# Patient Record
Sex: MALE | Race: WHITE | Employment: OTHER | URBAN - METROPOLITAN AREA
[De-identification: names, ages, dates, MRNs, and addresses within clinical notes are randomized per-mention and may not be internally consistent; named-entity substitution may affect disease eponyms.]

---

## 2017-04-07 ENCOUNTER — LAB ENCOUNTER (OUTPATIENT)
Dept: LAB | Age: 62
End: 2017-04-07
Attending: FAMILY MEDICINE
Payer: COMMERCIAL

## 2017-04-07 ENCOUNTER — OFFICE VISIT (OUTPATIENT)
Dept: FAMILY MEDICINE CLINIC | Facility: CLINIC | Age: 62
End: 2017-04-07

## 2017-04-07 VITALS
DIASTOLIC BLOOD PRESSURE: 72 MMHG | HEIGHT: 70.75 IN | WEIGHT: 191 LBS | SYSTOLIC BLOOD PRESSURE: 110 MMHG | HEART RATE: 52 BPM | BODY MASS INDEX: 26.74 KG/M2

## 2017-04-07 DIAGNOSIS — Z85.46 HISTORY OF PROSTATE CANCER: ICD-10-CM

## 2017-04-07 DIAGNOSIS — Z00.00 LABORATORY EXAMINATION ORDERED AS PART OF A ROUTINE GENERAL MEDICAL EXAMINATION: ICD-10-CM

## 2017-04-07 DIAGNOSIS — R73.9 ELEVATED BLOOD SUGAR: ICD-10-CM

## 2017-04-07 DIAGNOSIS — M65.332 TRIGGER FINGER, LEFT MIDDLE FINGER: ICD-10-CM

## 2017-04-07 DIAGNOSIS — Z00.00 WELL ADULT EXAM: Primary | ICD-10-CM

## 2017-04-07 PROCEDURE — 84439 ASSAY OF FREE THYROXINE: CPT | Performed by: FAMILY MEDICINE

## 2017-04-07 PROCEDURE — 80050 GENERAL HEALTH PANEL: CPT | Performed by: FAMILY MEDICINE

## 2017-04-07 PROCEDURE — 82306 VITAMIN D 25 HYDROXY: CPT | Performed by: FAMILY MEDICINE

## 2017-04-07 PROCEDURE — 84153 ASSAY OF PSA TOTAL: CPT | Performed by: FAMILY MEDICINE

## 2017-04-07 PROCEDURE — 80061 LIPID PANEL: CPT | Performed by: FAMILY MEDICINE

## 2017-04-07 PROCEDURE — 36415 COLL VENOUS BLD VENIPUNCTURE: CPT | Performed by: FAMILY MEDICINE

## 2017-04-07 PROCEDURE — 99396 PREV VISIT EST AGE 40-64: CPT | Performed by: FAMILY MEDICINE

## 2017-04-07 PROCEDURE — 83036 HEMOGLOBIN GLYCOSYLATED A1C: CPT | Performed by: FAMILY MEDICINE

## 2017-04-07 NOTE — PATIENT INSTRUCTIONS
SCHEDULING [facility] LAB APPOINTMENTS ONLINE    Lab appointments can now be scheduled online at [email or web address]    · Go to [email or web address]  · In Search type Lab  · Click \"Lab services\"  · Click \"Schedule Your Test Online\"  · Follow the prompts  · If you a member of your immediate family has had colon cancer, especially if their cancer occurred before they were 48years old. Prostate cancer tests:  The older way of looking for prostate cancer, the rectal exam, is no longer regarded as the best way to scre and urine tests. When you have no symptoms of illness, you should discuss the pros and cons of these and other tests with your healthcare provider. Each test involves some expense. What shots do I need?    The following shots are recommended for adults: expect your healthcare provider to advise you regularly on other ways to stay healthy. Some of these may include:   Substance use: Do not use tobacco or illegal drugs.  Avoid using alcohol while driving, swimming, boating, etc.   Diet and exercise: Try to m

## 2017-04-07 NOTE — PROGRESS NOTES
Demetrio Hernandez is a 58year old male who presents for a complete physical exam.   HPI:   Pt complains of cold for 6 weeks had one before xmas was on antibiotic then started mid [address] No sinus pain, Causes cough no shortness of breath. .  Sleep is good, s Pathologist:           Saturnino Finley MD                                                           Specimens:   A) - Colon polyp, Descending polyp                                                                 B) - Rectum, Rectal polyp TONSILLECTOMY      BACK SURGERY  1992    Comment Laminectomy Lumbar L4 L5    BACK SURGERY  4/1/13    Comment L4-S1 revision decomp with TLIF with Dr Gladis Menon at [address] [facility] 7/13/2016    Comment Procedure: COLONOSCOPY, POSSIBLE BIOPSY, POSS 70.75\"  Wt 191 lb  BMI 26.83 kg/m2  Body mass index is 26.83 kg/(m^2).    GENERAL: NAD, pleasant, well developed, normal voice  SKIN: no rashes, no suspicious moles or lesions  HENT: NCAT, EACs clear b/l, TMs normal b/l, nasal turbinatess normal b/l, oroph Encounter  mmm PSA  mmm TSH and Free T4 [E]  CBC W Differential W Platelet [E]  Comp Metabolic Panel (14) [E]  Lipid Panel [E]  Vitamin D, 25-Hydroxy [E]    Meds & Refills for this Visit:  No prescriptions requested or ordered in this encounter    Imaging history and that of your family are also important. What needs to be checked and how often?    The tests listed below are recommended for routine healthcare by the US Preventive Services Task Force (USPSTF) and the [employer] [place] (AAF with your healthcare provider. This is especially important if you have a higher risk of getting prostate cancer, for example, if you are  or have a family history of prostate cancer.    Gonorrhea and syphilis tests: if you are at high risk even if it has been less than 10 years since your last tetanus booster. Flu shot every fall if you are 50 or older, you have a high risk for complications from the flu, or you might spread the flu to others who are at high risk.    Measles, mumps, and rub in locked cabinets when they are not in use. Install smoke detectors in your home. Set your hot water heater to less than 120°F (49°C). Dental health: Visit your dentist regularly. Brush your teeth with fluoride toothpaste daily.  Also floss your teeth da

## 2017-04-08 PROBLEM — M65.332 TRIGGER FINGER, LEFT MIDDLE FINGER: Status: ACTIVE | Noted: 2017-04-08

## 2017-04-08 NOTE — PROGRESS NOTES
Quick Note:    Cholesterol is slightly elevated but has excellent HDL no treatment needed continue with good diet and exercise. Elevated blood sugar add hemoglobin A1c. Lab results showed low Vitamin D.  Advise RX vitamin D 50,000 IU once weekly for 12 w

## 2017-04-10 ENCOUNTER — TELEPHONE (OUTPATIENT)
Dept: FAMILY MEDICINE CLINIC | Facility: CLINIC | Age: 62
End: 2017-04-10

## 2017-04-10 DIAGNOSIS — R73.9 ELEVATED BLOOD SUGAR: Primary | ICD-10-CM

## 2017-04-10 DIAGNOSIS — E55.9 VITAMIN D DEFICIENCY: ICD-10-CM

## 2017-04-10 RX ORDER — ERGOCALCIFEROL 1.25 MG/1
50000 CAPSULE ORAL WEEKLY
Qty: 12 CAPSULE | Refills: 0 | Status: SHIPPED | OUTPATIENT
Start: 2017-04-10 | End: 2017-07-09

## 2017-04-10 NOTE — TELEPHONE ENCOUNTER
The patient was informed of his test results and Dr. Maia Goldmann recommendations. Vitamin D prescription was electronically sent to the patient's preferred pharmacy and future vitamin Dc lab order has been placed.    [facility] lab confirmed that the hem

## 2017-04-10 NOTE — TELEPHONE ENCOUNTER
----- Message from Colten Bermudez PA-C sent at 4/8/2017  8:13 AM CDT -----  Cholesterol is slightly elevated but has excellent HDL no treatment needed continue with good diet and exercise. Elevated blood sugar add hemoglobin A1c.   Lab results showed

## 2017-04-10 NOTE — PROGRESS NOTES
Quick Note:    HBA1C indicates glucose intolerance c/w appropriate diet and exercise. Reduce the simple and complex carbohydrates including the white bread, rice and pasta.  Increase vegetables, fruits, protein and substitute complex carbohydrates with high

## 2017-04-11 ENCOUNTER — TELEPHONE (OUTPATIENT)
Dept: FAMILY MEDICINE CLINIC | Facility: CLINIC | Age: 62
End: 2017-04-11

## 2017-04-11 DIAGNOSIS — R73.02 GLUCOSE INTOLERANCE (IMPAIRED GLUCOSE TOLERANCE): Primary | ICD-10-CM

## 2017-04-11 NOTE — TELEPHONE ENCOUNTER
----- Message from Pankaj Vick PA-C sent at 4/10/2017  2:45 PM CDT -----  HBA1C indicates glucose intolerance c/w appropriate diet and exercise. Reduce the simple and complex carbohydrates including the white bread, rice and pasta.  Increase vegetabl

## 2017-04-26 ENCOUNTER — TELEPHONE (OUTPATIENT)
Dept: FAMILY MEDICINE CLINIC | Facility: CLINIC | Age: 62
End: 2017-04-26

## 2017-04-26 NOTE — TELEPHONE ENCOUNTER
lmom for pt with date of last tetanus 12/27/2010. Asked pt after reviewing message to call office with any questions.

## 2017-09-01 ENCOUNTER — LAB ENCOUNTER (OUTPATIENT)
Dept: LAB | Age: 62
End: 2017-09-01
Attending: FAMILY MEDICINE
Payer: COMMERCIAL

## 2017-09-01 DIAGNOSIS — E55.9 VITAMIN D DEFICIENCY: ICD-10-CM

## 2017-09-01 LAB — 25-HYDROXYVITAMIN D (TOTAL): 35.7 NG/ML (ref 30–100)

## 2017-09-01 PROCEDURE — 82306 VITAMIN D 25 HYDROXY: CPT

## 2017-09-01 PROCEDURE — 36415 COLL VENOUS BLD VENIPUNCTURE: CPT

## 2017-10-23 ENCOUNTER — LAB ENCOUNTER (OUTPATIENT)
Dept: LAB | Age: 62
End: 2017-10-23
Attending: FAMILY MEDICINE
Payer: COMMERCIAL

## 2017-10-23 DIAGNOSIS — R73.02 GLUCOSE INTOLERANCE (IMPAIRED GLUCOSE TOLERANCE): ICD-10-CM

## 2017-10-23 PROCEDURE — 83036 HEMOGLOBIN GLYCOSYLATED A1C: CPT

## 2017-10-23 PROCEDURE — 36415 COLL VENOUS BLD VENIPUNCTURE: CPT

## 2017-10-24 ENCOUNTER — TELEPHONE (OUTPATIENT)
Dept: FAMILY MEDICINE CLINIC | Facility: CLINIC | Age: 62
End: 2017-10-24

## 2017-10-24 DIAGNOSIS — R73.02 GLUCOSE INTOLERANCE (IMPAIRED GLUCOSE TOLERANCE): Primary | ICD-10-CM

## 2017-10-24 NOTE — TELEPHONE ENCOUNTER
----- Message from Cayden Park PA-C sent at 10/24/2017 12:58 PM CDT -----  HBA1C is improved still glucose intolerance watch carbohydrates and repeat in 6 months.   Order future tests/medications sent to my chart

## 2017-11-29 ENCOUNTER — OFFICE VISIT (OUTPATIENT)
Dept: FAMILY MEDICINE CLINIC | Facility: CLINIC | Age: 62
End: 2017-11-29

## 2017-11-29 VITALS
BODY MASS INDEX: 27.32 KG/M2 | HEART RATE: 56 BPM | HEIGHT: 70.39 IN | DIASTOLIC BLOOD PRESSURE: 66 MMHG | SYSTOLIC BLOOD PRESSURE: 122 MMHG | TEMPERATURE: 98 F | WEIGHT: 193 LBS

## 2017-11-29 DIAGNOSIS — J01.10 ACUTE FRONTAL SINUSITIS, RECURRENCE NOT SPECIFIED: Primary | ICD-10-CM

## 2017-11-29 DIAGNOSIS — Z23 NEED FOR VACCINATION: ICD-10-CM

## 2017-11-29 PROCEDURE — 90471 IMMUNIZATION ADMIN: CPT | Performed by: FAMILY MEDICINE

## 2017-11-29 PROCEDURE — 90686 IIV4 VACC NO PRSV 0.5 ML IM: CPT | Performed by: FAMILY MEDICINE

## 2017-11-29 PROCEDURE — 99214 OFFICE O/P EST MOD 30 MIN: CPT | Performed by: FAMILY MEDICINE

## 2017-11-29 RX ORDER — GUAIFENESIN AND CODEINE PHOSPHATE 100; 10 MG/5ML; MG/5ML
5 SOLUTION ORAL 3 TIMES DAILY PRN
Qty: 118 ML | Refills: 0 | Status: SHIPPED | OUTPATIENT
Start: 2017-11-29 | End: 2017-12-13

## 2017-11-29 RX ORDER — AZITHROMYCIN 250 MG/1
TABLET, FILM COATED ORAL
Qty: 6 TABLET | Refills: 0 | Status: SHIPPED | OUTPATIENT
Start: 2017-11-29 | End: 2018-05-22 | Stop reason: ALTCHOICE

## 2017-11-29 RX ORDER — BENZONATATE 200 MG/1
200 CAPSULE ORAL 3 TIMES DAILY PRN
Qty: 20 CAPSULE | Refills: 0 | Status: SHIPPED | OUTPATIENT
Start: 2017-11-29 | End: 2018-05-22 | Stop reason: ALTCHOICE

## 2017-11-29 NOTE — PATIENT INSTRUCTIONS
--rest, fluids, soups, humidifier, tea with lemon or honey, tylenol/advil as needed for discomfort  -- cough syrup before bed or as needed (no driving as may cause drowsiness)  -- benzonatate as needed for cough (non drowsy  -- otc generic mucinex (or

## 2017-11-30 NOTE — PROGRESS NOTES
CC:  Marycarmen Shields is a 58year old male here for Patient presents with:  URI: Rm. 1      HPI:     URI  -started 1 wk ago  -associated with cough, congestion, sinus pressure  -previous treatment: otc meds  -no fevers  -sick contacts: none    REVIEW OF SYSTE Tendon  No date: OTHER SURGICAL HISTORY      Comment: Prostate Surgery  No date: TONSILLECTOMY   Social History:    Smoking status: Former Smoker                                                              Packs/day: 0.00      Years: 0.00         Types: C times daily as needed for cough. Imaging & Referrals:  FLULAVAL INFLUENZA VACCINE QUAD PRESERVATIVE FREE 0.5 ML     The patient indicates understanding of these issues and agrees to the plan. The patient is asked to return in prn.   [address] [facility], [address]

## 2018-03-22 ENCOUNTER — HOSPITAL ENCOUNTER (OUTPATIENT)
Dept: CARDIOLOGY CLINIC | Facility: HOSPITAL | Age: 63
Discharge: HOME | End: 2018-03-22
Attending: FAMILY MEDICINE

## 2018-03-22 DIAGNOSIS — Z13.9 ENCOUNTER FOR SCREENING: ICD-10-CM

## 2018-03-26 ENCOUNTER — TELEPHONE (OUTPATIENT)
Dept: FAMILY MEDICINE CLINIC | Facility: CLINIC | Age: 63
End: 2018-03-26

## 2018-03-26 NOTE — TELEPHONE ENCOUNTER
----- Message from Rosalba Hassan PA-C sent at 3/23/2018  5:15 PM CDT -----  Normal carotid artery u/s screening

## 2018-04-26 ENCOUNTER — LAB ENCOUNTER (OUTPATIENT)
Dept: LAB | Age: 63
End: 2018-04-26
Attending: FAMILY MEDICINE
Payer: COMMERCIAL

## 2018-04-26 DIAGNOSIS — R73.02 GLUCOSE INTOLERANCE (IMPAIRED GLUCOSE TOLERANCE): ICD-10-CM

## 2018-04-26 PROCEDURE — 83036 HEMOGLOBIN GLYCOSYLATED A1C: CPT

## 2018-04-26 PROCEDURE — 36415 COLL VENOUS BLD VENIPUNCTURE: CPT

## 2018-04-27 ENCOUNTER — TELEPHONE (OUTPATIENT)
Dept: FAMILY MEDICINE CLINIC | Facility: CLINIC | Age: 63
End: 2018-04-27

## 2018-04-27 DIAGNOSIS — E74.39 GLUCOSE INTOLERANCE: Primary | ICD-10-CM

## 2018-04-27 NOTE — TELEPHONE ENCOUNTER
----- Message from Adi Hylton PA-C sent at 4/26/2018  8:34 PM CDT -----  Hemoglobin A1c is normal at 5.6 repeat every 6 months secondary to history of glucose intolerance. .  Order future tests/medications sent to my chart    Future labs have been or

## 2018-04-27 NOTE — PROGRESS NOTES
Hemoglobin A1c is normal at 5.6 repeat every 6 months secondary to history of glucose intolerance. .  Order future tests/medications sent to my chart

## 2018-05-22 ENCOUNTER — OFFICE VISIT (OUTPATIENT)
Dept: FAMILY MEDICINE CLINIC | Facility: CLINIC | Age: 63
End: 2018-05-22

## 2018-05-22 VITALS
WEIGHT: 193 LBS | HEART RATE: 62 BPM | OXYGEN SATURATION: 98 % | HEIGHT: 69.69 IN | SYSTOLIC BLOOD PRESSURE: 112 MMHG | BODY MASS INDEX: 27.94 KG/M2 | TEMPERATURE: 98 F | DIASTOLIC BLOOD PRESSURE: 74 MMHG

## 2018-05-22 DIAGNOSIS — R05.9 COUGH: ICD-10-CM

## 2018-05-22 DIAGNOSIS — J01.10 ACUTE FRONTAL SINUSITIS, RECURRENCE NOT SPECIFIED: Primary | ICD-10-CM

## 2018-05-22 PROCEDURE — 99214 OFFICE O/P EST MOD 30 MIN: CPT | Performed by: FAMILY MEDICINE

## 2018-05-22 RX ORDER — BENZONATATE 200 MG/1
200 CAPSULE ORAL 3 TIMES DAILY PRN
Qty: 20 CAPSULE | Refills: 0 | Status: SHIPPED | OUTPATIENT
Start: 2018-05-22 | End: 2018-11-26 | Stop reason: ALTCHOICE

## 2018-05-22 RX ORDER — GUAIFENESIN AND CODEINE PHOSPHATE 100; 10 MG/5ML; MG/5ML
5 SOLUTION ORAL 3 TIMES DAILY PRN
Qty: 118 ML | Refills: 0 | Status: SHIPPED | OUTPATIENT
Start: 2018-05-22 | End: 2018-06-05

## 2018-05-22 NOTE — PATIENT INSTRUCTIONS
-- rest, fluids, soups, humidifier, tea with lemon or honey, tylenol/advil as needed for discomfort  -- cough syrup before bed or as needed (no driving as may cause drowsiness)  -- benzonatate as needed  -- continue generic mucinex (or with DM if coughin

## 2018-05-22 NOTE — PROGRESS NOTES
CC:  Estela Babcock is a 61year old male here for Patient presents with:  URI: sore throat, runny nose, nasal congestion, and cough.  Symptoms started approximately 8 days ago  Breathing Problem: Hard to breath at night       HPI:     URI  -started 8 days ag OTHER SURGICAL HISTORY      Comment: Ruptured Achilles Tendon  No date: OTHER SURGICAL HISTORY      Comment: Prostate Surgery  No date: TONSILLECTOMY   Social History:    Smoking status: Former Smoker issues and agrees to the plan. The patient is asked to return in prn.   Carlyn Sebastian MD

## 2018-11-26 ENCOUNTER — OFFICE VISIT (OUTPATIENT)
Dept: FAMILY MEDICINE CLINIC | Facility: CLINIC | Age: 63
End: 2018-11-26
Payer: COMMERCIAL

## 2018-11-26 VITALS
WEIGHT: 191 LBS | HEIGHT: 70.55 IN | HEART RATE: 60 BPM | TEMPERATURE: 98 F | SYSTOLIC BLOOD PRESSURE: 114 MMHG | BODY MASS INDEX: 27.04 KG/M2 | DIASTOLIC BLOOD PRESSURE: 80 MMHG

## 2018-11-26 DIAGNOSIS — Z85.46 HISTORY OF PROSTATE CANCER: ICD-10-CM

## 2018-11-26 DIAGNOSIS — Z00.00 LABORATORY EXAMINATION ORDERED AS PART OF A ROUTINE GENERAL MEDICAL EXAMINATION: ICD-10-CM

## 2018-11-26 DIAGNOSIS — E55.9 VITAMIN D DEFICIENCY: ICD-10-CM

## 2018-11-26 DIAGNOSIS — H61.22 IMPACTED CERUMEN OF LEFT EAR: ICD-10-CM

## 2018-11-26 DIAGNOSIS — E74.39 GLUCOSE INTOLERANCE: ICD-10-CM

## 2018-11-26 DIAGNOSIS — Z23 NEED FOR VACCINATION: ICD-10-CM

## 2018-11-26 DIAGNOSIS — Z00.00 WELL ADULT EXAM: Primary | ICD-10-CM

## 2018-11-26 DIAGNOSIS — D22.9 MULTIPLE PIGMENTED NEVI: ICD-10-CM

## 2018-11-26 PROCEDURE — 99396 PREV VISIT EST AGE 40-64: CPT | Performed by: FAMILY MEDICINE

## 2018-11-26 PROCEDURE — 90471 IMMUNIZATION ADMIN: CPT | Performed by: FAMILY MEDICINE

## 2018-11-26 PROCEDURE — 90686 IIV4 VACC NO PRSV 0.5 ML IM: CPT | Performed by: FAMILY MEDICINE

## 2018-11-26 NOTE — PROGRESS NOTES
Perla Funes is a 61year old male who presents for a complete physical exam.   HPI:   Pt here for CPE .   Immunizations needed Shingles new vaccine    Running  Still at a 1/2 marathon doing well with exercise   Denies any chest pain, SOB, dizziness or farooq tablet by mouth daily.  Disp:  Rfl:       Past Medical History:   Diagnosis Date   • Colon polyps    • Personal history of malignant neoplasm of prostate 01/26/12   • Unspecified essential hypertension       Past Surgical History:   Procedure Laterality Gurvinder swelling  GI: denies abdominal pain,denies heartburn, denies n/v/c/d/change in stools/blood in stool/black stool/change in appetite  : denies nocturia or changes in urinary stream, denies scrotal mass/abnormal discharge from urethra  MUSCULOSKELETAL: den symmetric.  Gait is normal.  PSYCH: normal affect, no apparent thought disorder, average judgement and insight      Immunization History  Administered            Date(s) Administered    >=3 YRS TRI  MULTIDOSE VIAL (01787) FLU CLINIC FREE    7. Vitamin D deficiency  - VITAMIN D, 25-HYDROXY    8. Laboratory examination ordered as part of a routine general medical examination  - CBC WITH DIFFERENTIAL WITH PLATELET  - COMP METABOLIC PANEL (14)  - LIPID PANEL  - VITAMIN D, 25-HYDROXY  - TS

## 2018-11-28 ENCOUNTER — TELEPHONE (OUTPATIENT)
Dept: FAMILY MEDICINE CLINIC | Facility: CLINIC | Age: 63
End: 2018-11-28

## 2018-11-28 DIAGNOSIS — E55.9 VITAMIN D DEFICIENCY: Primary | ICD-10-CM

## 2018-11-28 DIAGNOSIS — E74.39 GLUCOSE INTOLERANCE: ICD-10-CM

## 2018-11-28 RX ORDER — ERGOCALCIFEROL 1.25 MG/1
50000 CAPSULE ORAL WEEKLY
Qty: 12 CAPSULE | Refills: 0 | Status: SHIPPED | OUTPATIENT
Start: 2018-11-28 | End: 2019-02-14

## 2018-11-28 NOTE — PROGRESS NOTES
CBC, TSH and PSA are normal.  Lipids elevated follow up to discuss medication. Lab results showed low vitamin D. Advise prescription vitamin D 50,000 international units once weekly for 12 weeks.  After 3 months start a maintenance dose of over-the-counte

## 2018-11-28 NOTE — TELEPHONE ENCOUNTER
An order was placed for Vitamin D to be done in 3 months and prescription for Vitamin D was sent to the patient's pharmacy. An order was also placed for a HgbA1c to be done in 6 months.

## 2018-11-28 NOTE — TELEPHONE ENCOUNTER
----- Message from Santana Corona PA-C sent at 11/27/2018  8:02 PM CST -----  CBC, TSH and PSA are normal.  Lipids elevated follow up to discuss medication. Lab results showed low vitamin D.   Advise prescription vitamin D 50,000 international units onc

## 2019-01-22 ENCOUNTER — OFFICE VISIT (OUTPATIENT)
Dept: FAMILY MEDICINE CLINIC | Facility: CLINIC | Age: 64
End: 2019-01-22
Payer: COMMERCIAL

## 2019-01-22 VITALS
WEIGHT: 197.5 LBS | HEIGHT: 70.55 IN | SYSTOLIC BLOOD PRESSURE: 130 MMHG | HEART RATE: 62 BPM | OXYGEN SATURATION: 98 % | BODY MASS INDEX: 27.96 KG/M2 | DIASTOLIC BLOOD PRESSURE: 70 MMHG | TEMPERATURE: 98 F

## 2019-01-22 DIAGNOSIS — R05.9 COUGH: Primary | ICD-10-CM

## 2019-01-22 PROCEDURE — 99214 OFFICE O/P EST MOD 30 MIN: CPT | Performed by: FAMILY MEDICINE

## 2019-01-22 RX ORDER — BENZONATATE 200 MG/1
200 CAPSULE ORAL 3 TIMES DAILY PRN
Qty: 20 CAPSULE | Refills: 0 | Status: SHIPPED | OUTPATIENT
Start: 2019-01-22 | End: 2019-09-05 | Stop reason: ALTCHOICE

## 2019-01-22 RX ORDER — AZITHROMYCIN 250 MG/1
TABLET, FILM COATED ORAL
Qty: 6 TABLET | Refills: 0 | Status: SHIPPED | OUTPATIENT
Start: 2019-01-22 | End: 2019-09-05 | Stop reason: ALTCHOICE

## 2019-01-22 RX ORDER — GUAIFENESIN AND CODEINE PHOSPHATE 100; 10 MG/5ML; MG/5ML
5 SOLUTION ORAL 3 TIMES DAILY PRN
Qty: 118 ML | Refills: 0 | Status: SHIPPED | OUTPATIENT
Start: 2019-01-22 | End: 2019-10-11

## 2019-01-22 NOTE — PATIENT INSTRUCTIONS
--rest, fluids, soups, humidifier, tea with lemon or honey, tylenol/advil as needed for discomfort  -- cough syrup before bed or as needed (no driving as may cause drowsiness)  -- benzonatate as needed for cough (non - drowsy)  -- otc generic mucinex (or

## 2019-02-12 DIAGNOSIS — E55.9 VITAMIN D DEFICIENCY: ICD-10-CM

## 2019-02-12 RX ORDER — ERGOCALCIFEROL 1.25 MG/1
CAPSULE ORAL
Qty: 12 CAPSULE | Refills: 0 | OUTPATIENT
Start: 2019-02-12

## 2019-03-07 ENCOUNTER — APPOINTMENT (OUTPATIENT)
Dept: LAB | Age: 64
End: 2019-03-07
Attending: FAMILY MEDICINE
Payer: COMMERCIAL

## 2019-03-07 LAB — VIT D+METAB SERPL-MCNC: 36.7 NG/ML (ref 30–100)

## 2019-08-27 ENCOUNTER — APPOINTMENT (OUTPATIENT)
Dept: LAB | Age: 64
End: 2019-08-27
Attending: FAMILY MEDICINE
Payer: COMMERCIAL

## 2019-08-27 LAB
EST. AVERAGE GLUCOSE BLD GHB EST-MCNC: 126 MG/DL (ref 68–126)
HBA1C MFR BLD HPLC: 6 % (ref ?–5.7)

## 2019-09-04 ENCOUNTER — TELEPHONE (OUTPATIENT)
Dept: FAMILY MEDICINE CLINIC | Facility: CLINIC | Age: 64
End: 2019-09-04

## 2019-09-04 NOTE — TELEPHONE ENCOUNTER
Left message for patient recommending he schedule an appointment for further evaluation of symptoms and adequate management recommendations.

## 2019-09-04 NOTE — TELEPHONE ENCOUNTER
Patient called wanting to know how he can obtain a referral/order for PT for his hamstring injury states this has been an ongoing issue but started bothering him again 2 weeks ago.

## 2019-09-05 ENCOUNTER — OFFICE VISIT (OUTPATIENT)
Dept: FAMILY MEDICINE CLINIC | Facility: CLINIC | Age: 64
End: 2019-09-05
Payer: COMMERCIAL

## 2019-09-05 VITALS
HEIGHT: 70.55 IN | HEART RATE: 56 BPM | DIASTOLIC BLOOD PRESSURE: 74 MMHG | BODY MASS INDEX: 27.61 KG/M2 | WEIGHT: 195 LBS | SYSTOLIC BLOOD PRESSURE: 104 MMHG

## 2019-09-05 DIAGNOSIS — S76.311A STRAIN OF RIGHT HAMSTRING MUSCLE, INITIAL ENCOUNTER: Primary | ICD-10-CM

## 2019-09-05 PROCEDURE — 99213 OFFICE O/P EST LOW 20 MIN: CPT | Performed by: FAMILY MEDICINE

## 2019-09-05 RX ORDER — CYCLOBENZAPRINE HCL 10 MG
TABLET ORAL NIGHTLY PRN
Qty: 15 TABLET | Refills: 0 | Status: SHIPPED | OUTPATIENT
Start: 2019-09-05 | End: 2019-09-25

## 2019-09-05 RX ORDER — METHYLPREDNISOLONE 4 MG/1
TABLET ORAL
Qty: 1 KIT | Refills: 0 | Status: SHIPPED | OUTPATIENT
Start: 2019-09-05 | End: 2019-10-11 | Stop reason: ALTCHOICE

## 2019-09-05 NOTE — PROGRESS NOTES
Fredy Valadez is a 59year old male. HPI:   Patient is an active runner is in secondary to a strain in the medial aspect of his hamstring on the right side.   Patient states that he runs avidly was going to do a half marathon next month and now with the inj headaches  Musculoskeletal: see above  EXAM:   /74 (BP Location: Left arm, Patient Position: Sitting, Cuff Size: adult)   Pulse 56   Ht 70.55\"   Wt 195 lb   BMI 27.55 kg/m²   GENERAL: well developed, well nourished,in no apparent distress  SKIN: no methylPREDNISolone (MEDROL) 4 MG Oral Tablet Therapy Pack; As directed. Dispense: 1 kit; Refill: 0  - Cyclobenzaprine HCl 10 MG Oral Tab; Take 0.5-1 tablets (5-10 mg total) by mouth nightly as needed for Muscle spasms. Dispense: 15 tablet;  Refill: 0  - O

## 2019-10-11 ENCOUNTER — OFFICE VISIT (OUTPATIENT)
Dept: FAMILY MEDICINE CLINIC | Facility: CLINIC | Age: 64
End: 2019-10-11
Payer: COMMERCIAL

## 2019-10-11 VITALS
DIASTOLIC BLOOD PRESSURE: 72 MMHG | HEART RATE: 72 BPM | HEIGHT: 70.55 IN | WEIGHT: 196 LBS | SYSTOLIC BLOOD PRESSURE: 130 MMHG | TEMPERATURE: 99 F | OXYGEN SATURATION: 96 % | BODY MASS INDEX: 27.75 KG/M2

## 2019-10-11 DIAGNOSIS — R05.9 COUGH: ICD-10-CM

## 2019-10-11 DIAGNOSIS — J06.9 UPPER RESPIRATORY TRACT INFECTION, UNSPECIFIED TYPE: Primary | ICD-10-CM

## 2019-10-11 PROCEDURE — 99214 OFFICE O/P EST MOD 30 MIN: CPT | Performed by: FAMILY MEDICINE

## 2019-10-11 RX ORDER — ALBUTEROL SULFATE 90 UG/1
2 AEROSOL, METERED RESPIRATORY (INHALATION) EVERY 6 HOURS PRN
Qty: 1 INHALER | Refills: 0 | Status: SHIPPED | OUTPATIENT
Start: 2019-10-11 | End: 2019-12-03 | Stop reason: ALTCHOICE

## 2019-10-11 RX ORDER — AZITHROMYCIN 250 MG/1
TABLET, FILM COATED ORAL
COMMUNITY
Start: 2019-10-10 | End: 2019-12-03 | Stop reason: ALTCHOICE

## 2019-10-11 RX ORDER — GUAIFENESIN AND CODEINE PHOSPHATE 100; 10 MG/5ML; MG/5ML
5 SOLUTION ORAL 3 TIMES DAILY PRN
Qty: 118 ML | Refills: 0 | Status: SHIPPED | OUTPATIENT
Start: 2019-10-11 | End: 2019-10-11

## 2019-10-11 RX ORDER — BENZONATATE 200 MG/1
CAPSULE ORAL
Refills: 0 | COMMUNITY
Start: 2019-10-07 | End: 2019-12-03 | Stop reason: ALTCHOICE

## 2019-10-11 RX ORDER — PREDNISONE 20 MG/1
TABLET ORAL
Refills: 0 | COMMUNITY
Start: 2019-10-07 | End: 2019-12-03 | Stop reason: ALTCHOICE

## 2019-10-11 RX ORDER — GUAIFENESIN AND CODEINE PHOSPHATE 100; 10 MG/5ML; MG/5ML
5 SOLUTION ORAL 3 TIMES DAILY PRN
Qty: 118 ML | Refills: 0 | Status: SHIPPED | OUTPATIENT
Start: 2019-10-11 | End: 2019-10-25

## 2019-10-11 NOTE — PATIENT INSTRUCTIONS
- would expect slow continued improvement  - complete prednisone  - tessalon as needed  -start albuterol inhaler as needed  -- start guaifenesin with codeine as needed for cough cough (no driving while taking as may cause drowsiness)  -start zpak only if

## 2019-10-11 NOTE — PROGRESS NOTES
CC:  Juana De Leon is a 59year old male here for Patient presents with:  Sore Throat: Phlegms, sore throat, fevers, bodychills, no bodyache on and off x 2 weeks. Patient believes he might have bronchitis.       HPI:     URI  -started 2 wks ago  -5 days ago, POSSIBLE BIOPSY, POSSIBLE POLYPECTOMY [address] N/A 7/13/2016    Performed by Claudette Mesa, MD at [facility]   • OTHER SURGICAL HISTORY      knee arthroscopy   • OTHER SURGICAL HISTORY  1990    Ruptured Achilles Tendon   • OTHER SURGICAL HISTOR every 6 (six) hours as needed for Wheezing (or cough). • guaiFENesin-Codeine 100-10 MG/5ML Oral Syrup 118 mL 0     Sig: Take 5 mL by mouth 3 (three) times daily as needed (cough).        Imaging & Referrals:  None     The patient indicates understanding o

## 2019-12-03 ENCOUNTER — OFFICE VISIT (OUTPATIENT)
Dept: FAMILY MEDICINE CLINIC | Facility: CLINIC | Age: 64
End: 2019-12-03
Payer: COMMERCIAL

## 2019-12-03 VITALS
WEIGHT: 191 LBS | BODY MASS INDEX: 26.74 KG/M2 | HEART RATE: 72 BPM | SYSTOLIC BLOOD PRESSURE: 124 MMHG | DIASTOLIC BLOOD PRESSURE: 80 MMHG | HEIGHT: 70.75 IN

## 2019-12-03 DIAGNOSIS — Z00.00 WELL ADULT EXAM: Primary | ICD-10-CM

## 2019-12-03 DIAGNOSIS — Z00.00 LABORATORY EXAMINATION ORDERED AS PART OF A ROUTINE GENERAL MEDICAL EXAMINATION: ICD-10-CM

## 2019-12-03 DIAGNOSIS — Z23 NEED FOR VACCINATION: ICD-10-CM

## 2019-12-03 DIAGNOSIS — Z85.46 HISTORY OF PROSTATE CANCER: ICD-10-CM

## 2019-12-03 DIAGNOSIS — E74.39 GLUCOSE INTOLERANCE: ICD-10-CM

## 2019-12-03 PROCEDURE — 90686 IIV4 VACC NO PRSV 0.5 ML IM: CPT | Performed by: FAMILY MEDICINE

## 2019-12-03 PROCEDURE — 90715 TDAP VACCINE 7 YRS/> IM: CPT | Performed by: FAMILY MEDICINE

## 2019-12-03 PROCEDURE — 90471 IMMUNIZATION ADMIN: CPT | Performed by: FAMILY MEDICINE

## 2019-12-03 PROCEDURE — 99396 PREV VISIT EST AGE 40-64: CPT | Performed by: FAMILY MEDICINE

## 2019-12-03 PROCEDURE — 90472 IMMUNIZATION ADMIN EACH ADD: CPT | Performed by: FAMILY MEDICINE

## 2019-12-03 NOTE — PROGRESS NOTES
Flora Yang is a 59year old male who presents for a complete physical exam.   HPI:   Pt here for CPE . Retired  Immunizations completed the [employer]. Tetanus is due.     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      Results for orders placed or performed in visit on 11/ watches fats closely and watches sugar closely     REVIEW OF SYSTEMS:   GENERAL: feels well overall, denies fever or chills, denies change in weight  SKIN: denies any unusual skin lesions  EYES: denies changes in vision  HENT: denies upper respiratory symp testes, no scrotal masses, no hernia, no penile lesions  RECTAL: deferred   Prostate: deferred no prostate had surgery  MUSCULOSKELETAL: Back with normal AROM, no joint swelling, extremities x 4 with normal strength 5/5 and symmetric and with normal AROM/P FREE 0.5 ML  TETANUS, DIPHTHERIA TOXOIDS AND ACELLULAR PERTUSIS VACCINE (TDAP), >7 YEARS, IM USE  1. Well adult exam  Recommend healthy diet including green leafy vegetables, fresh fruits and lean meats.   Aerobic exercise 30 minutes five days a week for ca

## 2019-12-03 NOTE — PATIENT INSTRUCTIONS
Routine Healthcare for Men   Routine checkups can find treatable problems early. For many medical problems, early treatment can help prevent more serious complications. The value of checkups and how often you have them depend mainly on your age.  Your perso controversial. Many studies have been done, but they do not yet show that it is practical to do it on all men at their checkups. The test often gives misleading results and can cause undue anxiety, expense, and unnecessary medical procedures.  You should di whooping cough (pertussis) as well as tetanus. If you are 72 or older, this new vaccine has not yet been approved for your age group.  Because babies are most susceptible to complications from whooping cough, Tdap is especially recommended for adults caring vegetables in your diet. Get regular physical activity or exercise. Injury prevention: Use lap and shoulder belts when you drive. Use a helmet when you ride a motorcycle or bicycle.  If you are around guns or other firearms, practice safe handling and ar

## 2019-12-04 ENCOUNTER — TELEPHONE (OUTPATIENT)
Dept: FAMILY MEDICINE CLINIC | Facility: CLINIC | Age: 64
End: 2019-12-04

## 2019-12-04 DIAGNOSIS — E78.00 ELEVATED LDL CHOLESTEROL LEVEL: Primary | ICD-10-CM

## 2019-12-04 RX ORDER — ROSUVASTATIN CALCIUM 5 MG/1
5 TABLET, COATED ORAL NIGHTLY
Qty: 90 TABLET | Refills: 0 | Status: SHIPPED | OUTPATIENT
Start: 2019-12-04 | End: 2020-03-10

## 2019-12-04 NOTE — PROGRESS NOTES
PSA is nondetectable. Normal CBC, thyroid and CMP. LDL has increased goal is less than 100. Start Crestor 5 mg watch for any muscle aches. Repeat lipids and liver function tests in 8 weeks.   Order future tests/medications sent to my chart

## 2019-12-04 NOTE — TELEPHONE ENCOUNTER
----- Message from Ana Issa PA-C sent at 12/4/2019  7:10 AM CST -----  PSA is nondetectable. Normal CBC, thyroid and CMP. LDL has increased goal is less than 100. Start Crestor 5 mg watch for any muscle aches.   Repeat lipids and liver function

## 2019-12-04 NOTE — TELEPHONE ENCOUNTER
----- Message from Maryjane Johnson PA-C sent at 12/4/2019  7:10 AM CST -----  PSA is nondetectable. Normal CBC, thyroid and CMP. LDL has increased goal is less than 100. Start Crestor 5 mg watch for any muscle aches.   Repeat lipids and liver function

## 2019-12-04 NOTE — TELEPHONE ENCOUNTER
Informed patient of test results and recommendations. VU and is in agreement. Crestor 5mg ordered and future labs placed.

## 2020-02-11 LAB
ALBUMIN/GLOBULIN RATIO: 1.9 (CALC) (ref 1–2.5)
ALBUMIN: 4.1 G/DL (ref 3.6–5.1)
ALKALINE PHOSPHATASE: 57 U/L (ref 35–144)
ALT: 38 U/L (ref 9–46)
AST: 34 U/L (ref 10–35)
BILIRUBIN, DIRECT: 0.3 MG/DL
BILIRUBIN, INDIRECT: 0.9 MG/DL (CALC) (ref 0.2–1.2)
BILIRUBIN, TOTAL: 1.2 MG/DL (ref 0.2–1.2)
CHOL/HDLC RATIO: 2.1 (CALC)
CHOLESTEROL, TOTAL: 184 MG/DL
GLOBULIN: 2.2 G/DL (CALC) (ref 1.9–3.7)
HDL CHOLESTEROL: 88 MG/DL
LDL-CHOLESTEROL: 79 MG/DL (CALC)
NON-HDL CHOLESTEROL: 96 MG/DL (CALC)
PROTEIN, TOTAL: 6.3 G/DL (ref 6.1–8.1)
TRIGLYCERIDES: 86 MG/DL

## 2020-02-12 NOTE — TELEPHONE ENCOUNTER
Lipids are  looking great continue with Crestor 5 mg liver function tests are normal.  Repeat lipids and liver function tests in 6 months.   Order future tests/medications sent to my chart

## 2020-03-10 DIAGNOSIS — E78.00 ELEVATED LDL CHOLESTEROL LEVEL: ICD-10-CM

## 2020-03-10 RX ORDER — ROSUVASTATIN CALCIUM 5 MG/1
TABLET, COATED ORAL
Qty: 90 TABLET | Refills: 1 | Status: SHIPPED | OUTPATIENT
Start: 2020-03-10 | End: 2020-09-21

## 2020-09-12 ENCOUNTER — TELEPHONE (OUTPATIENT)
Dept: FAMILY MEDICINE CLINIC | Facility: CLINIC | Age: 65
End: 2020-09-12

## 2020-09-12 DIAGNOSIS — R17 ELEVATED BILIRUBIN: Primary | ICD-10-CM

## 2020-09-12 DIAGNOSIS — Z87.19 HISTORY OF GALLSTONES: ICD-10-CM

## 2020-09-12 NOTE — PROGRESS NOTES
Since there is a mild elevation in the bilirubin check CBC, hepatoglobulin and lactate dehydrogenase levels. Repeat liver/gallbladder ultrasound--- history of gallstones. 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   Would like to do a office

## 2020-09-14 DIAGNOSIS — R17 ELEVATED BILIRUBIN: Primary | ICD-10-CM

## 2020-09-14 NOTE — PROGRESS NOTES
Sorhuma glez was used to dictate and word was not spelled right it is \"Haptoglobin\". I placed the order to [facility] not sure if they can add it on but order is in the system. Thanks and sorry.

## 2020-09-15 LAB
ALBUMIN/GLOBULIN RATIO: 1.8 (CALC) (ref 1–2.5)
ALBUMIN: 4.2 G/DL (ref 3.6–5.1)
ALKALINE PHOSPHATASE: 63 U/L (ref 35–144)
ALT: 37 U/L (ref 9–46)
AST: 29 U/L (ref 10–35)
BILIRUBIN, DIRECT: 0.3 MG/DL
BILIRUBIN, INDIRECT: 1.6 MG/DL (CALC) (ref 0.2–1.2)
BILIRUBIN, TOTAL: 1.9 MG/DL (ref 0.2–1.2)
CHOL/HDLC RATIO: 1.8 (CALC)
CHOLESTEROL, TOTAL: 183 MG/DL
GLOBULIN: 2.3 G/DL (CALC) (ref 1.9–3.7)
HAPTOGLOBIN: 111 MG/DL (ref 43–212)
HDL CHOLESTEROL: 102 MG/DL
LD: 171 U/L (ref 120–250)
LDL-CHOLESTEROL: 65 MG/DL (CALC)
NON-HDL CHOLESTEROL: 81 MG/DL (CALC)
PROTEIN, TOTAL: 6.5 G/DL (ref 6.1–8.1)
TRIGLYCERIDES: 84 MG/DL

## 2020-09-16 NOTE — PROGRESS NOTES
Normal LDH and haptoglobin. Still awaiting CBC and abdominal ultrasound. Blood work is negative for hemolytic anemia. Delete the active LDH and haptoglobin orders that are still in the system.

## 2020-09-20 DIAGNOSIS — E78.00 ELEVATED LDL CHOLESTEROL LEVEL: ICD-10-CM

## 2020-09-21 RX ORDER — ROSUVASTATIN CALCIUM 5 MG/1
TABLET, COATED ORAL
Qty: 90 TABLET | Refills: 1 | Status: SHIPPED | OUTPATIENT
Start: 2020-09-21 | End: 2021-04-12

## 2020-10-16 ENCOUNTER — OFFICE VISIT (OUTPATIENT)
Dept: FAMILY MEDICINE CLINIC | Facility: CLINIC | Age: 65
End: 2020-10-16
Payer: MEDICARE

## 2020-10-16 VITALS
TEMPERATURE: 98 F | HEART RATE: 62 BPM | SYSTOLIC BLOOD PRESSURE: 118 MMHG | DIASTOLIC BLOOD PRESSURE: 78 MMHG | WEIGHT: 189 LBS | OXYGEN SATURATION: 98 % | BODY MASS INDEX: 26.76 KG/M2 | HEIGHT: 70.28 IN

## 2020-10-16 DIAGNOSIS — E78.2 MIXED HYPERLIPIDEMIA: ICD-10-CM

## 2020-10-16 DIAGNOSIS — Z00.00 ENCOUNTER FOR ANNUAL HEALTH EXAMINATION: Primary | ICD-10-CM

## 2020-10-16 DIAGNOSIS — Z23 NEED FOR VACCINATION: ICD-10-CM

## 2020-10-16 DIAGNOSIS — R94.31 T WAVE INVERSION IN EKG: ICD-10-CM

## 2020-10-16 DIAGNOSIS — Z13.29 THYROID DISORDER SCREEN: ICD-10-CM

## 2020-10-16 DIAGNOSIS — Z23 FLU VACCINE NEED: ICD-10-CM

## 2020-10-16 DIAGNOSIS — Z85.46 HISTORY OF PROSTATE CANCER: ICD-10-CM

## 2020-10-16 PROCEDURE — G0008 ADMIN INFLUENZA VIRUS VAC: HCPCS | Performed by: FAMILY MEDICINE

## 2020-10-16 PROCEDURE — G0402 INITIAL PREVENTIVE EXAM: HCPCS | Performed by: FAMILY MEDICINE

## 2020-10-16 PROCEDURE — G0403 EKG FOR INITIAL PREVENT EXAM: HCPCS | Performed by: FAMILY MEDICINE

## 2020-10-16 PROCEDURE — 90662 IIV NO PRSV INCREASED AG IM: CPT | Performed by: FAMILY MEDICINE

## 2020-10-16 PROCEDURE — 90670 PCV13 VACCINE IM: CPT | Performed by: FAMILY MEDICINE

## 2020-10-16 PROCEDURE — G0009 ADMIN PNEUMOCOCCAL VACCINE: HCPCS | Performed by: FAMILY MEDICINE

## 2020-10-16 NOTE — PATIENT INSTRUCTIONS
[address] SCREENING SCHEDULE   Tests on this list are recommended by your physician but may not be covered, or covered at this frequency, by your insurer. Please check with your insurance carrier before scheduling to verify coverage.     PREVENTATIVE Screening Covered up to Age 76     Colonoscopy Screen   Covered every 10 years- more often if abnormal Colonoscopy due on 07/13/2021 Update Health Maintenance if applicable    Flex Sigmoidoscopy Screen  Covered every 5 years No results found for this or an abusers     Tetanus Toxoid- Only covered with a cut with metal- TD and TDaP Not covered by Medicare Part B) Orders placed or performed in visit on 12/03/19   • TETANUS, DIPHTHERIA TOXOIDS AND ACELLULAR PERTUSIS VACCINE (TDAP), >7 YEARS, IM USE    This may

## 2020-10-16 NOTE — PROGRESS NOTES
HPI:   Jennifer León is a 72year old male who presents for a Medicare Initial Preventative Physical Exam (Welcome to Medicare- < 12 months on Medicare).   Specialists  Dermatology Dr. Jo Base yearly no issues  Optometrist every 2 years no issues    9/10/20 average exercise capacity, achieving 13 METS. 5. No significant arrhythmias.   Annual Physical due on 10/16/2021    Mixed hyperlipidemia  On Crestor 5 mg tolerates well no side effects denies myalgias  - EKG FOR INITIAL PREVENT EXAM  T wave inversion in EK Start date: 1971        Quit date: 1984        Years since quittin.8      Smokeless tobacco: Never Used         CAGE Alcohol screening   Celi Castle was screened for Alcohol abuse and had a score of 0 so is at low risk.      Patient Care Te Erythematosus in his sister; Parkinson's Disease in his mother. SOCIAL HISTORY:   He  reports that he quit smoking about 36 years ago. His smoking use included cigarettes. He started smoking about 49 years ago.  He has never used smokeless tobacco. He rep deferred secondary to COVID-19   Throat:    Neck: Supple, symmetrical, trachea midline, no adenopathy, thyroid: not enlarged, symmetric, no tenderness/mass/nodules, no carotid bruit or JVD   Back:   Symmetric, no curvature, ROM normal, no CVA tenderness EXAM  -     COMP METABOLIC PANEL (14)    T wave inversion in EKG  Saw cardiologist at [facility] cleared had to leave inversions V1 through V4 get CT angiogram coronary arteries which was normal 7/9/2012   History of prostate cancer  -     PSA, DIAGNOSTI Flex Sigmoidoscopy Screen every 10 years No results found for this or any previous visit. No flowsheet data found. Fecal Occult Blood Annually No results found for: FOB No flowsheet data found.     Glaucoma Screening      Ophthalmology Visit Annually

## 2020-10-17 PROBLEM — M65.332 TRIGGER FINGER, LEFT MIDDLE FINGER: Status: RESOLVED | Noted: 2017-04-08 | Resolved: 2020-10-17

## 2020-10-17 PROBLEM — R94.31 T WAVE INVERSION IN EKG: Status: ACTIVE | Noted: 2020-10-17

## 2020-12-17 NOTE — PROGRESS NOTES
PSA remains less than 0.1. Thyroid testing is normal.  Complete metabolic panel is normal the bilirubin is reducing.   Sent to my chart

## 2021-03-18 ENCOUNTER — PATIENT MESSAGE (OUTPATIENT)
Dept: FAMILY MEDICINE CLINIC | Facility: CLINIC | Age: 66
End: 2021-03-18

## 2021-03-18 NOTE — TELEPHONE ENCOUNTER
From: Salina Christianson  To: Cayden Park PA-C  Sent: 3/18/2021 12:23 PM CDT  Subject: Other    I received the Moderna Covid vaccine, both doses last month in [place].  I need to take myself off the list in MyChart with MIHAI but I can't find any way to d

## 2021-04-10 DIAGNOSIS — E78.00 ELEVATED LDL CHOLESTEROL LEVEL: ICD-10-CM

## 2021-04-12 RX ORDER — ROSUVASTATIN CALCIUM 5 MG/1
TABLET, COATED ORAL
Qty: 90 TABLET | Refills: 1 | Status: SHIPPED | OUTPATIENT
Start: 2021-04-12 | End: 2021-10-19

## 2021-10-18 DIAGNOSIS — E78.00 ELEVATED LDL CHOLESTEROL LEVEL: ICD-10-CM

## 2021-10-19 DIAGNOSIS — E78.00 ELEVATED LDL CHOLESTEROL LEVEL: ICD-10-CM

## 2021-10-19 RX ORDER — ROSUVASTATIN CALCIUM 5 MG/1
TABLET, COATED ORAL
Qty: 90 TABLET | Refills: 0 | OUTPATIENT
Start: 2021-10-19

## 2021-10-19 RX ORDER — ROSUVASTATIN CALCIUM 5 MG/1
TABLET, COATED ORAL
Qty: 30 TABLET | Refills: 0 | Status: SHIPPED | OUTPATIENT
Start: 2021-10-19 | End: 2021-11-18

## 2021-11-10 ENCOUNTER — TELEPHONE (OUTPATIENT)
Dept: FAMILY MEDICINE CLINIC | Facility: CLINIC | Age: 66
End: 2021-11-10

## 2021-11-10 ENCOUNTER — OFFICE VISIT (OUTPATIENT)
Dept: FAMILY MEDICINE CLINIC | Facility: CLINIC | Age: 66
End: 2021-11-10
Payer: MEDICARE

## 2021-11-10 VITALS
TEMPERATURE: 97 F | WEIGHT: 191 LBS | DIASTOLIC BLOOD PRESSURE: 70 MMHG | SYSTOLIC BLOOD PRESSURE: 122 MMHG | HEIGHT: 70.39 IN | BODY MASS INDEX: 27.04 KG/M2 | HEART RATE: 56 BPM

## 2021-11-10 DIAGNOSIS — E78.2 MIXED HYPERLIPIDEMIA: ICD-10-CM

## 2021-11-10 DIAGNOSIS — R73.9 HYPERGLYCEMIA: ICD-10-CM

## 2021-11-10 DIAGNOSIS — Z23 FLU VACCINE NEED: ICD-10-CM

## 2021-11-10 DIAGNOSIS — Z23 NEED FOR VACCINATION: ICD-10-CM

## 2021-11-10 DIAGNOSIS — Z13.6 SCREENING FOR CARDIOVASCULAR CONDITION: ICD-10-CM

## 2021-11-10 DIAGNOSIS — Z13.29 THYROID DISORDER SCREEN: ICD-10-CM

## 2021-11-10 DIAGNOSIS — Z98.890 HISTORY OF BACK SURGERY: ICD-10-CM

## 2021-11-10 DIAGNOSIS — Z00.00 ENCOUNTER FOR ANNUAL HEALTH EXAMINATION: Primary | ICD-10-CM

## 2021-11-10 DIAGNOSIS — Z13.0 SCREENING FOR DEFICIENCY ANEMIA: ICD-10-CM

## 2021-11-10 DIAGNOSIS — D22.9 MULTIPLE PIGMENTED NEVI: ICD-10-CM

## 2021-11-10 DIAGNOSIS — Z85.46 HISTORY OF PROSTATE CANCER: ICD-10-CM

## 2021-11-10 DIAGNOSIS — Z12.5 ENCOUNTER FOR SCREENING FOR MALIGNANT NEOPLASM OF PROSTATE: ICD-10-CM

## 2021-11-10 DIAGNOSIS — M51.16 LUMBAR DISC DISEASE WITH RADICULOPATHY: ICD-10-CM

## 2021-11-10 PROCEDURE — G0008 ADMIN INFLUENZA VIRUS VAC: HCPCS | Performed by: FAMILY MEDICINE

## 2021-11-10 PROCEDURE — 90662 IIV NO PRSV INCREASED AG IM: CPT | Performed by: FAMILY MEDICINE

## 2021-11-10 PROCEDURE — 80061 LIPID PANEL: CPT | Performed by: FAMILY MEDICINE

## 2021-11-10 PROCEDURE — 83036 HEMOGLOBIN GLYCOSYLATED A1C: CPT | Performed by: FAMILY MEDICINE

## 2021-11-10 PROCEDURE — G0009 ADMIN PNEUMOCOCCAL VACCINE: HCPCS | Performed by: FAMILY MEDICINE

## 2021-11-10 PROCEDURE — 80053 COMPREHEN METABOLIC PANEL: CPT | Performed by: FAMILY MEDICINE

## 2021-11-10 PROCEDURE — 90732 PPSV23 VACC 2 YRS+ SUBQ/IM: CPT | Performed by: FAMILY MEDICINE

## 2021-11-10 PROCEDURE — 84443 ASSAY THYROID STIM HORMONE: CPT | Performed by: FAMILY MEDICINE

## 2021-11-10 PROCEDURE — G0438 PPPS, INITIAL VISIT: HCPCS | Performed by: FAMILY MEDICINE

## 2021-11-10 PROCEDURE — 85025 COMPLETE CBC W/AUTO DIFF WBC: CPT | Performed by: FAMILY MEDICINE

## 2021-11-10 NOTE — PROGRESS NOTES
HPI:   Gulshan Chanel is a 77year old male who presents for a Medicare Initial Annual Wellness visit (Once after 12 month Medicare anniversary) .   Specialists  Dermatology was seeing Dr. Nilsa Isbell in the past he retired he plans on seeing the new dermato represents a false-positive stress EKG. 3. Normal left ventricular function, ejection fraction 63%. 4. Above average exercise capacity, achieving 13 METS. 5. No significant arrhythmias.   Annual Physical due on 10/16/2021     Mixed hyperlipidemia  On Cre Globulin  3.6 2.8 - 4.4 g/dL    A/G Ratio 0.9 (L) 1.0 - 2.0    FASTING Yes    TSH W REFLEX TO FREE T4   Result Value Ref Range    TSH 1.390 0.358 - 3.740 mIU/mL   HEMOGLOBIN A1C   Result Value Ref Range    HgbA1C 5.9 (H) <5.7 %    Estimated Average Glucose have a Power of  for [employer] on file in [place].    Advance care planning including the explanation and discussion of advance directives standard forms performed Face to Face with patient and Family/surrogate (if present), and forms available to pa He  has a past medical history of Cancer ([facility] [address] (2010), Colon polyps, Personal history of malignant neoplasm of prostate (01/26/12), T wave inversion in EKG (10/17/2020), and Unspecified essential hypertension.     He  has a past surgical history that incl mj).    Medicare Hearing Assessment  (Required for AWV/SWV)    Hearing Screening    Time taken: 11/10/2021  8:51 AM  Entry User: MI Waller  Screening Method: Finger Rub  Finger Rub Result: Pass                Visual Acuity • FLU VAC QIV SPLIT 3 YRS AND OLDER (57317) 11/29/2017, 11/26/2018   • FLULAVAL 6 months & older 0.5 ml Prefilled syringe (79912) 11/29/2017, 11/26/2018, 12/03/2019   • FLUZONE 6 months and older PFS 0.5 ml (98023) 11/14/2014, 11/29/2017, 11/26/2018   • understanding of these issues and agrees to the plan. Lab work ordered. Reinforced healthy diet, lifestyle, and exercise. No follow-ups on file.      Katherine Arriola PA-C, 11/10/2021     General Health     In the past six months, have you lost more t Colorectal Cancer Screening  Covered for ages 52-80; only need ONE of the following:    Colonoscopy   Covered every 10 years    Covered every 2 years if patient is at high risk or previous colonoscopy was abnormal 07/13/2016    Colonoscopy due on 07/13/2

## 2021-11-10 NOTE — TELEPHONE ENCOUNTER
Las referral placed was to Dr. Sabrina Fox.      Left message to call back, will give [address]  Phone: [phone]

## 2021-11-10 NOTE — PROGRESS NOTES
Patient came in for draw of ordered fasting labs. Patient drawn out of Right AC, x 1 attempt and tolerated well.  1 Lght Grn and 1 Lav tube drawn.

## 2021-11-10 NOTE — PATIENT INSTRUCTIONS
Routine Healthcare for Men   Routine checkups can find treatable problems early. For many medical problems, early treatment can help prevent more serious complications. The value of checkups and how often you have them depend mainly on your age.  Your perso controversial. Many studies have been done, but they do not yet show that it is practical to do it on all men at their checkups. The test often gives misleading results and can cause undue anxiety, expense, and unnecessary medical procedures.  You should di whooping cough (pertussis) as well as tetanus. If you are 72 or older, this new vaccine has not yet been approved for your age group.  Because babies are most susceptible to complications from whooping cough, Tdap is especially recommended for adults caring vegetables in your diet. Get regular physical activity or exercise. Injury prevention: Use lap and shoulder belts when you drive. Use a helmet when you ride a motorcycle or bicycle.  If you are around guns or other firearms, practice safe handling and ar (AAA) Covered once in a lifetime for one of the following risk factors   • Men who are 73-68 years old and have ever smoked   • Anyone with a family history -     Colorectal Cancer Screening  Covered for ages 52-80; only need ONE of the following:    Colon State forms available on it's website for anyone to review and print using their home computer and printer. (the forms are also available in [address])  www. putitinwriting. org  This link also has information from the [address] regarding A

## 2021-11-10 NOTE — TELEPHONE ENCOUNTER
Arturo Longoria is calling to see if he can get a different name for a colonoscopy he is having problems with his old Dr that did his last colonoscopy, Please call Arturo Longoria at [phone]

## 2021-11-11 PROBLEM — R94.31 T WAVE INVERSION IN EKG: Status: RESOLVED | Noted: 2020-10-17 | Resolved: 2021-11-11

## 2021-11-11 NOTE — PROGRESS NOTES
Hemoglobin A1c is stable still glucose intolerance continue with monitoring carbohydrates. PSA still undetectable. Lipid panel looks great.   Thyroid testing is normal.  Kidney function and liver function testing are normal.  Bilirubin is normal.  Complet

## 2021-11-18 DIAGNOSIS — E78.00 ELEVATED LDL CHOLESTEROL LEVEL: ICD-10-CM

## 2021-11-18 RX ORDER — ROSUVASTATIN CALCIUM 5 MG/1
TABLET, COATED ORAL
Qty: 90 TABLET | Refills: 1 | Status: SHIPPED | OUTPATIENT
Start: 2021-11-18

## 2021-11-18 NOTE — TELEPHONE ENCOUNTER
Requested Prescriptions     Signed Prescriptions Disp Refills   • ROSUVASTATIN 5 MG Oral Tab 90 tablet 1     Sig: TAKE 1 TABLET(5 MG) BY MOUTH EVERY NIGHT     Authorizing Provider: Isaias Willams     Ordering User: Fritz Gardner     Met protocol.  Refill

## 2022-01-06 NOTE — TELEPHONE ENCOUNTER
Pt notified of results via my chart, future orders in the system [FreeTextEntry1] : DIAGNOSIS:    LUMBAR SPONDYLOSIS/STENOSIS     DISK DEGENERATION\par TREATMENT PLAN:  NON-SURGICAL\par \par This is a patient with degenerated lumbar disks with associated stenosis and spondylosis.  I have recommended nonsurgical management at this time.  This consists of physical therapy and/or manual medicine in conjunction to medical therapy and other conservative methods.  These include the consideration of trigger point injections and or the utilization of modalities such as TENS where applicable.  The next tier would be the referral to a pain management specialist (anesthesia or physiatry) for the consideration of spinal injections.  This includes the options of epidural steroids, facet injections as well as other novel techniques that may provide pain relief.  Although there is indeed evidence of disk degeneration on imaging, discectomy or spinal fusion for the sole purposes of treating back/leg pain in the absence of a compressive lesion or neurological issue is associated with poor outcomes.   \par \par I have reviewed the images in detail with the patient today in my office and have answered all questions regarding this condition to the best of my ability to the patient’s satisfaction.\par

## 2022-02-15 RX ORDER — ROSUVASTATIN CALCIUM 5 MG/1
TABLET, COATED ORAL
Qty: 90 TABLET | Refills: 1 | OUTPATIENT
Start: 2022-02-15

## 2022-06-05 DIAGNOSIS — E78.00 ELEVATED LDL CHOLESTEROL LEVEL: ICD-10-CM

## 2022-06-06 RX ORDER — ROSUVASTATIN CALCIUM 5 MG/1
5 TABLET, COATED ORAL NIGHTLY
Qty: 90 TABLET | Refills: 1 | Status: SHIPPED | OUTPATIENT
Start: 2022-06-06

## 2022-11-07 ENCOUNTER — OFFICE VISIT (OUTPATIENT)
Dept: FAMILY MEDICINE CLINIC | Facility: CLINIC | Age: 67
End: 2022-11-07
Payer: MEDICARE

## 2022-11-07 VITALS
HEIGHT: 70.08 IN | DIASTOLIC BLOOD PRESSURE: 68 MMHG | TEMPERATURE: 97 F | OXYGEN SATURATION: 98 % | SYSTOLIC BLOOD PRESSURE: 130 MMHG | HEART RATE: 68 BPM | RESPIRATION RATE: 20 BRPM | BODY MASS INDEX: 27.92 KG/M2 | WEIGHT: 195 LBS

## 2022-11-07 DIAGNOSIS — Z00.00 ENCOUNTER FOR ANNUAL HEALTH EXAMINATION: Primary | ICD-10-CM

## 2022-11-07 DIAGNOSIS — Z13.6 ENCOUNTER FOR LIPID SCREENING FOR CARDIOVASCULAR DISEASE: ICD-10-CM

## 2022-11-07 DIAGNOSIS — R20.2 NUMBNESS AND TINGLING IN BOTH HANDS: ICD-10-CM

## 2022-11-07 DIAGNOSIS — Z85.46 HISTORY OF PROSTATE CANCER: ICD-10-CM

## 2022-11-07 DIAGNOSIS — D22.9 MULTIPLE PIGMENTED NEVI: ICD-10-CM

## 2022-11-07 DIAGNOSIS — Z13.29 SCREENING FOR ENDOCRINE, NUTRITIONAL, METABOLIC AND IMMUNITY DISORDER: ICD-10-CM

## 2022-11-07 DIAGNOSIS — R20.0 NUMBNESS AND TINGLING IN BOTH HANDS: ICD-10-CM

## 2022-11-07 DIAGNOSIS — Z13.0 SCREENING FOR DEFICIENCY ANEMIA: ICD-10-CM

## 2022-11-07 DIAGNOSIS — Z13.220 ENCOUNTER FOR LIPID SCREENING FOR CARDIOVASCULAR DISEASE: ICD-10-CM

## 2022-11-07 DIAGNOSIS — Z98.890 HISTORY OF BACK SURGERY: ICD-10-CM

## 2022-11-07 DIAGNOSIS — Z13.6 SCREENING FOR HEART DISEASE: ICD-10-CM

## 2022-11-07 DIAGNOSIS — Z13.29 THYROID DISORDER SCREEN: ICD-10-CM

## 2022-11-07 DIAGNOSIS — R73.9 HYPERGLYCEMIA: ICD-10-CM

## 2022-11-07 DIAGNOSIS — Z13.0 SCREENING FOR ENDOCRINE, NUTRITIONAL, METABOLIC AND IMMUNITY DISORDER: ICD-10-CM

## 2022-11-07 DIAGNOSIS — Z13.6 SCREENING FOR CARDIOVASCULAR CONDITION: ICD-10-CM

## 2022-11-07 DIAGNOSIS — Z13.21 SCREENING FOR ENDOCRINE, NUTRITIONAL, METABOLIC AND IMMUNITY DISORDER: ICD-10-CM

## 2022-11-07 DIAGNOSIS — Z23 NEED FOR INFLUENZA VACCINATION: ICD-10-CM

## 2022-11-07 DIAGNOSIS — Z13.228 SCREENING FOR ENDOCRINE, NUTRITIONAL, METABOLIC AND IMMUNITY DISORDER: ICD-10-CM

## 2022-11-07 PROCEDURE — 96160 PT-FOCUSED HLTH RISK ASSMT: CPT | Performed by: FAMILY MEDICINE

## 2022-11-07 PROCEDURE — 99397 PER PM REEVAL EST PAT 65+ YR: CPT | Performed by: FAMILY MEDICINE

## 2022-11-07 PROCEDURE — 3075F SYST BP GE 130 - 139MM HG: CPT | Performed by: FAMILY MEDICINE

## 2022-11-07 PROCEDURE — 90662 IIV NO PRSV INCREASED AG IM: CPT | Performed by: FAMILY MEDICINE

## 2022-11-07 PROCEDURE — 1126F AMNT PAIN NOTED NONE PRSNT: CPT | Performed by: FAMILY MEDICINE

## 2022-11-07 PROCEDURE — 3008F BODY MASS INDEX DOCD: CPT | Performed by: FAMILY MEDICINE

## 2022-11-07 PROCEDURE — G0439 PPPS, SUBSEQ VISIT: HCPCS | Performed by: FAMILY MEDICINE

## 2022-11-07 PROCEDURE — G0008 ADMIN INFLUENZA VIRUS VAC: HCPCS | Performed by: FAMILY MEDICINE

## 2022-11-07 PROCEDURE — 3078F DIAST BP <80 MM HG: CPT | Performed by: FAMILY MEDICINE

## 2022-11-07 RX ORDER — KETOCONAZOLE 20 MG/G
1 CREAM TOPICAL DAILY
COMMUNITY

## 2022-11-08 PROBLEM — R73.9 HYPERGLYCEMIA: Status: ACTIVE | Noted: 2022-11-08

## 2022-11-08 PROBLEM — R20.2 NUMBNESS AND TINGLING IN BOTH HANDS: Status: ACTIVE | Noted: 2022-11-08

## 2022-11-08 PROBLEM — R20.0 NUMBNESS AND TINGLING IN BOTH HANDS: Status: ACTIVE | Noted: 2022-11-08

## 2022-11-17 ENCOUNTER — LAB ENCOUNTER (OUTPATIENT)
Dept: LAB | Age: 67
End: 2022-11-17
Attending: NURSE PRACTITIONER
Payer: MEDICARE

## 2022-11-17 DIAGNOSIS — Z13.21 SCREENING FOR ENDOCRINE, NUTRITIONAL, METABOLIC AND IMMUNITY DISORDER: ICD-10-CM

## 2022-11-17 DIAGNOSIS — Z13.0 SCREENING FOR DEFICIENCY ANEMIA: ICD-10-CM

## 2022-11-17 DIAGNOSIS — R20.2 NUMBNESS AND TINGLING IN BOTH HANDS: ICD-10-CM

## 2022-11-17 DIAGNOSIS — Z13.29 SCREENING FOR ENDOCRINE, NUTRITIONAL, METABOLIC AND IMMUNITY DISORDER: ICD-10-CM

## 2022-11-17 DIAGNOSIS — R73.9 HYPERGLYCEMIA: ICD-10-CM

## 2022-11-17 DIAGNOSIS — Z13.6 SCREENING FOR CARDIOVASCULAR CONDITION: ICD-10-CM

## 2022-11-17 DIAGNOSIS — R20.0 NUMBNESS AND TINGLING IN BOTH HANDS: ICD-10-CM

## 2022-11-17 DIAGNOSIS — Z13.6 ENCOUNTER FOR LIPID SCREENING FOR CARDIOVASCULAR DISEASE: ICD-10-CM

## 2022-11-17 DIAGNOSIS — Z13.228 SCREENING FOR ENDOCRINE, NUTRITIONAL, METABOLIC AND IMMUNITY DISORDER: ICD-10-CM

## 2022-11-17 DIAGNOSIS — Z85.46 HISTORY OF PROSTATE CANCER: ICD-10-CM

## 2022-11-17 DIAGNOSIS — Z13.0 SCREENING FOR ENDOCRINE, NUTRITIONAL, METABOLIC AND IMMUNITY DISORDER: ICD-10-CM

## 2022-11-17 DIAGNOSIS — Z13.220 ENCOUNTER FOR LIPID SCREENING FOR CARDIOVASCULAR DISEASE: ICD-10-CM

## 2022-11-17 DIAGNOSIS — Z13.29 THYROID DISORDER SCREEN: ICD-10-CM

## 2022-11-17 LAB
ALBUMIN SERPL-MCNC: 3.7 G/DL (ref 3.4–5)
ALBUMIN/GLOB SERPL: 1.3 {RATIO} (ref 1–2)
ALP LIVER SERPL-CCNC: 53 U/L
ALT SERPL-CCNC: 36 U/L
ANION GAP SERPL CALC-SCNC: 7 MMOL/L (ref 0–18)
AST SERPL-CCNC: 22 U/L (ref 15–37)
BASOPHILS # BLD AUTO: 0.1 X10(3) UL (ref 0–0.2)
BASOPHILS NFR BLD AUTO: 1.7 %
BILIRUB SERPL-MCNC: 1.9 MG/DL (ref 0.1–2)
BUN BLD-MCNC: 15 MG/DL (ref 7–18)
BUN/CREAT SERPL: 17.4 (ref 10–20)
CALCIUM BLD-MCNC: 9.6 MG/DL (ref 8.5–10.1)
CHLORIDE SERPL-SCNC: 108 MMOL/L (ref 98–112)
CHOLEST SERPL-MCNC: 173 MG/DL (ref ?–200)
CO2 SERPL-SCNC: 28 MMOL/L (ref 21–32)
COMPLEXED PSA SERPL-MCNC: <0.01 NG/ML (ref ?–4)
CREAT BLD-MCNC: 0.86 MG/DL
DEPRECATED RDW RBC AUTO: 45.4 FL (ref 35.1–46.3)
EOSINOPHIL # BLD AUTO: 0.14 X10(3) UL (ref 0–0.7)
EOSINOPHIL NFR BLD AUTO: 2.4 %
ERYTHROCYTE [DISTWIDTH] IN BLOOD BY AUTOMATED COUNT: 13.6 % (ref 11–15)
EST. AVERAGE GLUCOSE BLD GHB EST-MCNC: 117 MG/DL (ref 68–126)
FASTING PATIENT LIPID ANSWER: YES
FASTING STATUS PATIENT QL REPORTED: YES
GFR SERPLBLD BASED ON 1.73 SQ M-ARVRAT: 95 ML/MIN/1.73M2 (ref 60–?)
GLOBULIN PLAS-MCNC: 2.8 G/DL (ref 2.8–4.4)
GLUCOSE BLD-MCNC: 95 MG/DL (ref 70–99)
HBA1C MFR BLD: 5.7 % (ref ?–5.7)
HCT VFR BLD AUTO: 44.4 %
HDLC SERPL-MCNC: 104 MG/DL (ref 40–59)
HGB BLD-MCNC: 14.8 G/DL
IMM GRANULOCYTES # BLD AUTO: 0.01 X10(3) UL (ref 0–1)
IMM GRANULOCYTES NFR BLD: 0.2 %
LDLC SERPL CALC-MCNC: 54 MG/DL (ref ?–100)
LYMPHOCYTES # BLD AUTO: 2.3 X10(3) UL (ref 1–4)
LYMPHOCYTES NFR BLD AUTO: 39.5 %
MCH RBC QN AUTO: 30.2 PG (ref 26–34)
MCHC RBC AUTO-ENTMCNC: 33.3 G/DL (ref 31–37)
MCV RBC AUTO: 90.6 FL
MONOCYTES # BLD AUTO: 0.47 X10(3) UL (ref 0.1–1)
MONOCYTES NFR BLD AUTO: 8.1 %
NEUTROPHILS # BLD AUTO: 2.81 X10 (3) UL (ref 1.5–7.7)
NEUTROPHILS # BLD AUTO: 2.81 X10(3) UL (ref 1.5–7.7)
NEUTROPHILS NFR BLD AUTO: 48.1 %
NONHDLC SERPL-MCNC: 69 MG/DL (ref ?–130)
OSMOLALITY SERPL CALC.SUM OF ELEC: 297 MOSM/KG (ref 275–295)
PLATELET # BLD AUTO: 233 10(3)UL (ref 150–450)
POTASSIUM SERPL-SCNC: 4.2 MMOL/L (ref 3.5–5.1)
PROT SERPL-MCNC: 6.5 G/DL (ref 6.4–8.2)
RBC # BLD AUTO: 4.9 X10(6)UL
SODIUM SERPL-SCNC: 143 MMOL/L (ref 136–145)
TRIGL SERPL-MCNC: 80 MG/DL (ref 30–149)
TSI SER-ACNC: 1.79 MIU/ML (ref 0.36–3.74)
VIT B12 SERPL-MCNC: 280 PG/ML (ref 193–986)
VLDLC SERPL CALC-MCNC: 12 MG/DL (ref 0–30)
WBC # BLD AUTO: 5.8 X10(3) UL (ref 4–11)

## 2022-11-17 PROCEDURE — 80053 COMPREHEN METABOLIC PANEL: CPT

## 2022-11-17 PROCEDURE — 36415 COLL VENOUS BLD VENIPUNCTURE: CPT

## 2022-11-17 PROCEDURE — 85025 COMPLETE CBC W/AUTO DIFF WBC: CPT

## 2022-11-17 PROCEDURE — 82607 VITAMIN B-12: CPT

## 2022-11-17 PROCEDURE — 80061 LIPID PANEL: CPT

## 2022-11-17 PROCEDURE — 84443 ASSAY THYROID STIM HORMONE: CPT

## 2022-11-17 PROCEDURE — 83036 HEMOGLOBIN GLYCOSYLATED A1C: CPT

## 2022-11-18 DIAGNOSIS — R73.9 HYPERGLYCEMIA: Primary | ICD-10-CM

## 2022-11-18 NOTE — PROGRESS NOTES
HBA1C improved mild glucose intolerance. Continue to reduce the simple and complex carbohydrates including the white bread, rice and pasta. Increase vegetables, fruits, protein and substitute complex carbohydrates with higher fiber/grained carbohydrates. Repeat the HBA1C in 6 months. Normal white and red blood cell counts. Normal kidney and liver function testing. Normal lipid testing they look great. Normal thyroid testing  Normal prostate testing (<0.01)  Vitamin B12 is low goal above 400 start methylcobalamin chewable or sublingual (vitamin B12) 1,000 mcg daily. Vitamin B12 can help with energy, nerve pain and anxiety.     Place order for hemoglobin A1C in 6 months

## 2022-12-10 DIAGNOSIS — E78.00 ELEVATED LDL CHOLESTEROL LEVEL: ICD-10-CM

## 2022-12-12 RX ORDER — ROSUVASTATIN CALCIUM 5 MG/1
TABLET, COATED ORAL
Qty: 90 TABLET | Refills: 3 | Status: SHIPPED | OUTPATIENT
Start: 2022-12-12

## 2023-01-25 ENCOUNTER — TELEPHONE (OUTPATIENT)
Dept: FAMILY MEDICINE CLINIC | Facility: CLINIC | Age: 68
End: 2023-01-25

## 2023-01-25 NOTE — TELEPHONE ENCOUNTER
Pt called and stated he has ear wax build up. Pt is looking for an apt this week. BODØ is out of the office until 2/8/23.  Pt is seeking advice

## 2023-01-25 NOTE — TELEPHONE ENCOUNTER
Spoke to pt with options can see if another provider has availability.   In the meantime he can try Debrox otc ear drops to help soften the wax as pt states it has been several weeks    He wants to try the drops first and then if still no relief he will call the office to schedule

## 2023-08-02 ENCOUNTER — HOSPITAL ENCOUNTER (OUTPATIENT)
Dept: CT IMAGING | Age: 68
Discharge: HOME | End: 2023-08-02
Attending: FAMILY MEDICINE

## 2023-08-02 DIAGNOSIS — Z13.9 ENCOUNTER FOR SCREENING: ICD-10-CM

## 2023-08-02 DIAGNOSIS — Z13.6 SCREENING FOR HEART DISEASE: ICD-10-CM

## 2023-10-05 DIAGNOSIS — E78.00 ELEVATED LDL CHOLESTEROL LEVEL: ICD-10-CM

## 2023-10-05 RX ORDER — ROSUVASTATIN CALCIUM 5 MG/1
5 TABLET, COATED ORAL NIGHTLY
Qty: 90 TABLET | Refills: 3 | Status: SHIPPED | OUTPATIENT
Start: 2023-10-05

## 2023-11-10 ENCOUNTER — OFFICE VISIT (OUTPATIENT)
Dept: FAMILY MEDICINE CLINIC | Facility: CLINIC | Age: 68
End: 2023-11-10
Payer: MEDICARE

## 2023-11-10 VITALS
HEIGHT: 70 IN | HEART RATE: 62 BPM | DIASTOLIC BLOOD PRESSURE: 82 MMHG | OXYGEN SATURATION: 97 % | BODY MASS INDEX: 27.58 KG/M2 | TEMPERATURE: 98 F | WEIGHT: 192.63 LBS | SYSTOLIC BLOOD PRESSURE: 132 MMHG

## 2023-11-10 DIAGNOSIS — G56.01 RIGHT CARPAL TUNNEL SYNDROME: ICD-10-CM

## 2023-11-10 DIAGNOSIS — Z12.5 SCREENING PSA (PROSTATE SPECIFIC ANTIGEN): ICD-10-CM

## 2023-11-10 DIAGNOSIS — Z98.890 HISTORY OF BACK SURGERY: ICD-10-CM

## 2023-11-10 DIAGNOSIS — E78.2 MIXED HYPERLIPIDEMIA: ICD-10-CM

## 2023-11-10 DIAGNOSIS — H61.23 BILATERAL HEARING LOSS DUE TO CERUMEN IMPACTION: ICD-10-CM

## 2023-11-10 DIAGNOSIS — D22.9 MULTIPLE PIGMENTED NEVI: ICD-10-CM

## 2023-11-10 DIAGNOSIS — Z85.46 HISTORY OF PROSTATE CANCER: ICD-10-CM

## 2023-11-10 DIAGNOSIS — Z13.0 SCREENING FOR DEFICIENCY ANEMIA: ICD-10-CM

## 2023-11-10 DIAGNOSIS — Z13.29 SCREENING FOR THYROID DISORDER: ICD-10-CM

## 2023-11-10 DIAGNOSIS — Z97.4 DOES USE HEARING AID: ICD-10-CM

## 2023-11-10 DIAGNOSIS — Z86.19 HISTORY OF TINEA PEDIS: ICD-10-CM

## 2023-11-10 DIAGNOSIS — R20.2 NUMBNESS AND TINGLING IN BOTH HANDS: ICD-10-CM

## 2023-11-10 DIAGNOSIS — R73.9 HYPERGLYCEMIA: ICD-10-CM

## 2023-11-10 DIAGNOSIS — R20.0 NUMBNESS AND TINGLING IN BOTH HANDS: ICD-10-CM

## 2023-11-10 DIAGNOSIS — Z00.00 ENCOUNTER FOR ANNUAL HEALTH EXAMINATION: Primary | ICD-10-CM

## 2023-11-10 RX ORDER — KETOCONAZOLE 20 MG/G
1 CREAM TOPICAL DAILY
Qty: 30 G | Refills: 0 | Status: SHIPPED | OUTPATIENT
Start: 2023-11-10

## 2023-11-11 PROBLEM — Z86.19 HISTORY OF TINEA PEDIS: Status: ACTIVE | Noted: 2023-11-11

## 2023-11-11 PROBLEM — G56.01 RIGHT CARPAL TUNNEL SYNDROME: Status: ACTIVE | Noted: 2023-11-11

## 2023-11-11 PROBLEM — E55.9 VITAMIN D DEFICIENCY: Status: RESOLVED | Noted: 2018-11-26 | Resolved: 2023-11-11

## 2023-11-11 PROBLEM — E74.39 GLUCOSE INTOLERANCE: Status: RESOLVED | Noted: 2018-11-26 | Resolved: 2023-11-11

## 2023-11-11 PROBLEM — E78.2 MIXED HYPERLIPIDEMIA: Status: ACTIVE | Noted: 2023-11-11

## 2023-11-11 PROBLEM — Z97.4 DOES USE HEARING AID: Status: ACTIVE | Noted: 2023-11-11

## 2023-11-18 ENCOUNTER — PATIENT MESSAGE (OUTPATIENT)
Dept: FAMILY MEDICINE CLINIC | Facility: CLINIC | Age: 68
End: 2023-11-18

## 2023-11-20 NOTE — TELEPHONE ENCOUNTER
From: Saige Guadalupe  To: Mike Gramajo  Sent: 11/18/2023 12:12 AM CST  Subject: Healthcare POA    Where do I download a copy of my healthcare POA in [employer]?

## 2023-11-21 ENCOUNTER — TELEPHONE (OUTPATIENT)
Dept: FAMILY MEDICINE CLINIC | Facility: CLINIC | Age: 68
End: 2023-11-21

## 2023-12-06 ENCOUNTER — LABORATORY ENCOUNTER (OUTPATIENT)
Dept: LAB | Age: 68
End: 2023-12-06
Attending: FAMILY MEDICINE
Payer: MEDICARE

## 2023-12-06 DIAGNOSIS — R73.9 HYPERGLYCEMIA: ICD-10-CM

## 2023-12-06 DIAGNOSIS — R20.0 NUMBNESS AND TINGLING IN BOTH HANDS: ICD-10-CM

## 2023-12-06 DIAGNOSIS — Z13.0 SCREENING FOR DEFICIENCY ANEMIA: ICD-10-CM

## 2023-12-06 DIAGNOSIS — R20.2 NUMBNESS AND TINGLING IN BOTH HANDS: ICD-10-CM

## 2023-12-06 DIAGNOSIS — E78.2 MIXED HYPERLIPIDEMIA: ICD-10-CM

## 2023-12-06 DIAGNOSIS — Z12.5 SCREENING PSA (PROSTATE SPECIFIC ANTIGEN): ICD-10-CM

## 2023-12-06 DIAGNOSIS — G56.01 RIGHT CARPAL TUNNEL SYNDROME: ICD-10-CM

## 2023-12-06 DIAGNOSIS — Z13.29 SCREENING FOR THYROID DISORDER: ICD-10-CM

## 2023-12-06 LAB
ALBUMIN SERPL-MCNC: 3.8 G/DL (ref 3.4–5)
ALBUMIN/GLOB SERPL: 1.2 {RATIO} (ref 1–2)
ALP LIVER SERPL-CCNC: 49 U/L
ALT SERPL-CCNC: 30 U/L
ANION GAP SERPL CALC-SCNC: 6 MMOL/L (ref 0–18)
AST SERPL-CCNC: 19 U/L (ref 15–37)
BASOPHILS # BLD AUTO: 0.12 X10(3) UL (ref 0–0.2)
BASOPHILS NFR BLD AUTO: 1.8 %
BILIRUB SERPL-MCNC: 1.9 MG/DL (ref 0.1–2)
BUN BLD-MCNC: 11 MG/DL (ref 9–23)
CALCIUM BLD-MCNC: 8.8 MG/DL (ref 8.5–10.1)
CHLORIDE SERPL-SCNC: 109 MMOL/L (ref 98–112)
CHOLEST SERPL-MCNC: 164 MG/DL (ref ?–200)
CO2 SERPL-SCNC: 26 MMOL/L (ref 21–32)
COMPLEXED PSA SERPL-MCNC: <0.01 NG/ML (ref ?–4)
CREAT BLD-MCNC: 0.88 MG/DL
EGFRCR SERPLBLD CKD-EPI 2021: 94 ML/MIN/1.73M2 (ref 60–?)
EOSINOPHIL # BLD AUTO: 0.24 X10(3) UL (ref 0–0.7)
EOSINOPHIL NFR BLD AUTO: 3.7 %
ERYTHROCYTE [DISTWIDTH] IN BLOOD BY AUTOMATED COUNT: 13.6 %
EST. AVERAGE GLUCOSE BLD GHB EST-MCNC: 126 MG/DL (ref 68–126)
FASTING PATIENT LIPID ANSWER: YES
FASTING STATUS PATIENT QL REPORTED: YES
GLOBULIN PLAS-MCNC: 3.2 G/DL (ref 2.8–4.4)
GLUCOSE BLD-MCNC: 93 MG/DL (ref 70–99)
HBA1C MFR BLD: 6 % (ref ?–5.7)
HCT VFR BLD AUTO: 44.2 %
HDLC SERPL-MCNC: 96 MG/DL (ref 40–59)
HGB BLD-MCNC: 14.9 G/DL
IMM GRANULOCYTES # BLD AUTO: 0.01 X10(3) UL (ref 0–1)
IMM GRANULOCYTES NFR BLD: 0.2 %
LDLC SERPL CALC-MCNC: 53 MG/DL (ref ?–100)
LYMPHOCYTES # BLD AUTO: 3.11 X10(3) UL (ref 1–4)
LYMPHOCYTES NFR BLD AUTO: 47.8 %
MCH RBC QN AUTO: 29.9 PG (ref 26–34)
MCHC RBC AUTO-ENTMCNC: 33.7 G/DL (ref 31–37)
MCV RBC AUTO: 88.6 FL
MONOCYTES # BLD AUTO: 0.62 X10(3) UL (ref 0.1–1)
MONOCYTES NFR BLD AUTO: 9.5 %
NEUTROPHILS # BLD AUTO: 2.41 X10 (3) UL (ref 1.5–7.7)
NEUTROPHILS # BLD AUTO: 2.41 X10(3) UL (ref 1.5–7.7)
NEUTROPHILS NFR BLD AUTO: 37 %
NONHDLC SERPL-MCNC: 68 MG/DL (ref ?–130)
OSMOLALITY SERPL CALC.SUM OF ELEC: 291 MOSM/KG (ref 275–295)
PLATELET # BLD AUTO: 215 10(3)UL (ref 150–450)
POTASSIUM SERPL-SCNC: 4 MMOL/L (ref 3.5–5.1)
PROT SERPL-MCNC: 7 G/DL (ref 6.4–8.2)
RBC # BLD AUTO: 4.99 X10(6)UL
SODIUM SERPL-SCNC: 141 MMOL/L (ref 136–145)
T4 FREE SERPL-MCNC: 1 NG/DL (ref 0.8–1.7)
TRIGL SERPL-MCNC: 85 MG/DL (ref 30–149)
TSI SER-ACNC: 2.75 MIU/ML (ref 0.36–3.74)
VLDLC SERPL CALC-MCNC: 12 MG/DL (ref 0–30)
WBC # BLD AUTO: 6.5 X10(3) UL (ref 4–11)

## 2023-12-06 PROCEDURE — 85025 COMPLETE CBC W/AUTO DIFF WBC: CPT

## 2023-12-06 PROCEDURE — 84443 ASSAY THYROID STIM HORMONE: CPT

## 2023-12-06 PROCEDURE — 80053 COMPREHEN METABOLIC PANEL: CPT

## 2023-12-06 PROCEDURE — 36415 COLL VENOUS BLD VENIPUNCTURE: CPT

## 2023-12-06 PROCEDURE — 84439 ASSAY OF FREE THYROXINE: CPT

## 2023-12-06 PROCEDURE — 80061 LIPID PANEL: CPT

## 2023-12-06 PROCEDURE — 83036 HEMOGLOBIN GLYCOSYLATED A1C: CPT

## 2023-12-07 DIAGNOSIS — R73.9 HYPERGLYCEMIA: Primary | ICD-10-CM

## 2023-12-07 NOTE — PROGRESS NOTES
Normal white and red blood cell counts. Normal kidney and liver function testing. Hemoglobin A1c  indicates glucose intolerance try to improve diet and exercise daily. Reduce the simple and complex carbohydrates including the white bread, rice and pasta. Increase vegetables, fruits, protein and substitute complex carbohydrates with higher fiber/grained carbohydrates. Repeat the hemoglobin A1c in 6 months.   Normal lipid testing great HDL which is cardioprotective  Normal thyroid testing  Normal prostate testing less than 0.01  Sincerely,   Anige Kinsey PA-C

## 2024-01-11 ENCOUNTER — OFFICE VISIT (OUTPATIENT)
Dept: ORTHOPEDICS CLINIC | Facility: CLINIC | Age: 69
End: 2024-01-11
Payer: MEDICARE

## 2024-01-11 VITALS — HEIGHT: 70 IN | BODY MASS INDEX: 26.48 KG/M2 | WEIGHT: 185 LBS

## 2024-01-11 DIAGNOSIS — M65.331 TRIGGER MIDDLE FINGER OF RIGHT HAND: Primary | ICD-10-CM

## 2024-01-11 PROCEDURE — 99204 OFFICE O/P NEW MOD 45 MIN: CPT | Performed by: ORTHOPAEDIC SURGERY

## 2024-01-11 PROCEDURE — 1160F RVW MEDS BY RX/DR IN RCRD: CPT | Performed by: ORTHOPAEDIC SURGERY

## 2024-01-11 PROCEDURE — 1125F AMNT PAIN NOTED PAIN PRSNT: CPT | Performed by: ORTHOPAEDIC SURGERY

## 2024-01-11 PROCEDURE — 1159F MED LIST DOCD IN RCRD: CPT | Performed by: ORTHOPAEDIC SURGERY

## 2024-01-11 PROCEDURE — 3008F BODY MASS INDEX DOCD: CPT | Performed by: ORTHOPAEDIC SURGERY

## 2024-01-11 PROCEDURE — 20550 NJX 1 TENDON SHEATH/LIGAMENT: CPT | Performed by: ORTHOPAEDIC SURGERY

## 2024-01-11 RX ORDER — BETAMETHASONE SODIUM PHOSPHATE AND BETAMETHASONE ACETATE 3; 3 MG/ML; MG/ML
6 INJECTION, SUSPENSION INTRA-ARTICULAR; INTRALESIONAL; INTRAMUSCULAR; SOFT TISSUE ONCE
Status: COMPLETED | OUTPATIENT
Start: 2024-01-11 | End: 2024-01-11

## 2024-01-11 RX ADMIN — BETAMETHASONE SODIUM PHOSPHATE AND BETAMETHASONE ACETATE 6 MG: 3; 3 INJECTION, SUSPENSION INTRA-ARTICULAR; INTRALESIONAL; INTRAMUSCULAR; SOFT TISSUE at 11:22:00

## 2024-01-11 NOTE — H&P
Clinic Note [facility]     Assessment/Plan:  68 year old male    Right carpal tunnel syndrome-symptoms are minimal and mostly at nighttime.  If his symptoms do not improve after the trigger finger resolves, further testing could be performed.  Brace at nighttime was not helpful.  Triggering of right middle finger-we discussed various surgical nonsurgical treatment options and decided proceed with a corticosteroid junction which she tolerated the complication.    Follow Up: 6-8 weeks    Injection:    Written consent was obtained.  The skin was prepped with alcohol.  Ethyl chloride spray was used anesthetize the superficial skin.  A 25-gauge needle was used to inject 1.5 mL mixture of 1 mL of 6 mg of betamethasone and 1 mL of 1% lidocaine into right  middle finger A1 pulley.  Hemostasis achieved.  Band-Aid was applied.  Patient tolerated procedure without complication.    Diagnostic Studies:  EMG/NCV: Deferred    Physical Exam:    Ht 5' 10\" (1.778 m)   Wt 185 lb (83.9 kg)   BMI 26.54 kg/m²     Constitutional: NAD. AOx3. Well-developed and Well-nourished.   Psychiatric: Normal mood/ affect/ behavior. Judgment and thought content normal.     Right upper Extremity:   Inspection: Skin Intact. No skin lesions. No gross deformity.   Palpation: Tenderness palpation of the A1 pulley   Motion: Elbow: normal bilateral symmetric ext/flex  Wrist: normal bilateral symmetric ext/flex/sup/pro  Finger: Triggering of the right middle finger is noted.  He has a flexion contracture of 30 degrees of passively correctable   Vascular 2+ radial pulse       Median Nerve Exam Right   Phdurkan neg   Sensation normal   PCBr Sensation normal   APB Weakness No   Thenar Atrophy No     Ulnar Nerve Exam    Sensation normal   1st JEOVANNY Weakness No   Hypothenar Atrophy No     Radial Nerve Exam  Right Left   Radial Sensory normal normal       CC: Right carpal tunnel syndrome/trigger finger    HPI: This 68 year old right-hand-dominant male  presents with numbness and tingling in the right hand.  He also ports intermittent locking.  Pain intensity is moderate.  Patient has tried finger brace and wrist brace.  Patient reports pain at nighttime and difficulty sleeping secondary to the pain.  It started September 2023.    Occupation: Retired    Past Medical History:   Diagnosis Date    Allergic reaction to bee sting     Cancer (HCC) 2010    prostate cancer - prostatecotomy    Colon polyps     Lumbar disc disease with radiculopathy     Personal history of malignant neoplasm of prostate 01/26/2012    T wave inversion in EKG 10/17/2020    Cardiology work-up [facility] cardiologist 7/16/2012 had normal CT angiogram    Unspecified essential hypertension     Vitamin D deficiency      Past Surgical History:   Procedure Laterality Date    BACK SURGERY  1992    Laminectomy Lumbar L4 L5    BACK SURGERY  4/1/13    L4-S1 revision decomp with TLIF with Dr Herrera at [place]    COLONOSCOPY  2016    COLONOSCOPY N/A 2/28/2022    Procedure: COLONOSCOPY,;  Surgeon: Jovanny Tapia MD;  Location: [facility]    COLONOSCOPY,BIOPSY N/A 7/13/2016    Procedure: COLONOSCOPY, POSSIBLE BIOPSY, POSSIBLE POLYPECTOMY 31671;  Surgeon: Jovanny Tapia MD;  Location: [facility]    OTHER SURGICAL HISTORY      knee arthroscopy    OTHER SURGICAL HISTORY  1990    Ruptured Achilles Tendon    OTHER SURGICAL HISTORY      Prostate Surgery    TONSILLECTOMY      VASECTOMY  1990     Current Outpatient Medications   Medication Sig Dispense Refill    ketoconazole 2 % External Cream Apply 1 Application  topically daily. 30 g 0    rosuvastatin 5 MG Oral Tab Take 1 tablet (5 mg total) by mouth nightly. 90 tablet 3     No Known Allergies  Family History   Problem Relation Age of Onset    Other (Parkinson's Disease[other]) Mother     Cancer Father         Colon/Prostate    Other (Lupus Erythematosus[other]) Sister         Systemic     Social History     Occupational History     Not on file   Tobacco Use    Smoking status: Former     Packs/day: 0.00     Years: 0.00     Additional pack years: 0.00     Total pack years: 0.00     Types: Cigarettes     Start date: 1971     Quit date: 1984     Years since quittin.0    Smokeless tobacco: Never   Vaping Use    Vaping Use: Never used   Substance and Sexual Activity    Alcohol use: Yes     Alcohol/week: 10.0 standard drinks of alcohol     Types: 4 Glasses of wine, 6 Cans of beer per week    Drug use: Never    Sexual activity: Not on file        Review of Systems (negative unless bolded):  General: fevers, chills, fatigue  CV:  chest pain, palpitations, leg swelling  Msk: bodyaches, neck pain, neck stiffness  Skin: rashes, open wounds, nonhealing ulcers  Hem: bleeds easily, bruise easily, immunocompromised  Neuro: dizziness, light headedness, headaches  Psych: anxious, depressed, anger issues          Luis Antonio Arango MD  Hand, Wrist, & Elbow Surgery  [facility] [place] [facility]  [address] [place] [address], [address] [place] [address].org  [email or web address]  t: [phone]  f: [phone]

## 2024-03-07 ENCOUNTER — OFFICE VISIT (OUTPATIENT)
Dept: ORTHOPEDICS CLINIC | Facility: CLINIC | Age: 69
End: 2024-03-07
Payer: MEDICARE

## 2024-03-07 VITALS — HEIGHT: 70 IN | WEIGHT: 185 LBS | BODY MASS INDEX: 26.48 KG/M2

## 2024-03-07 DIAGNOSIS — M65.331 TRIGGER MIDDLE FINGER OF RIGHT HAND: Primary | ICD-10-CM

## 2024-03-07 PROCEDURE — 99213 OFFICE O/P EST LOW 20 MIN: CPT | Performed by: ORTHOPAEDIC SURGERY

## 2024-03-07 PROCEDURE — 20550 NJX 1 TENDON SHEATH/LIGAMENT: CPT | Performed by: ORTHOPAEDIC SURGERY

## 2024-03-07 RX ORDER — BETAMETHASONE SODIUM PHOSPHATE AND BETAMETHASONE ACETATE 3; 3 MG/ML; MG/ML
6 INJECTION, SUSPENSION INTRA-ARTICULAR; INTRALESIONAL; INTRAMUSCULAR; SOFT TISSUE ONCE
Status: COMPLETED | OUTPATIENT
Start: 2024-03-07 | End: 2024-03-07

## 2024-03-07 RX ADMIN — BETAMETHASONE SODIUM PHOSPHATE AND BETAMETHASONE ACETATE 6 MG: 3; 3 INJECTION, SUSPENSION INTRA-ARTICULAR; INTRALESIONAL; INTRAMUSCULAR; SOFT TISSUE at 10:28:00

## 2024-03-07 NOTE — PROGRESS NOTES
Clinic Note [facility]     Assessment/Plan:  69 year old male    Right carpal tunnel syndrome-symptoms  resolved  Triggering of right middle finger- s/p 1 CSI with persistent symptoms. Proceed with CSI #2    Follow Up: 6 weeks for possible surgical intervention    Injection:    Written consent was obtained.  The skin was prepped with alcohol.  Ethyl chloride spray was used anesthetize the superficial skin.  A 25-gauge needle was used to inject 1.5 mL mixture of 1 mL of 6 mg of betamethasone and 1 mL of 1% lidocaine into right  middle finger A1 pulley.  Hemostasis achieved.  Band-Aid was applied.  Patient tolerated procedure without complication.    Diagnostic Studies:  EMG/NCV: Deferred    Physical Exam:    Ht 5' 10\" (1.778 m)   Wt 185 lb (83.9 kg)   BMI 26.54 kg/m²     Constitutional: NAD. AOx3. Well-developed and Well-nourished.   Psychiatric: Normal mood/ affect/ behavior. Judgment and thought content normal.     Right upper Extremity:   Inspection: Skin Intact. No skin lesions. No gross deformity.   Palpation: Tenderness palpation of the A1 pulley   Motion: Elbow: normal bilateral symmetric ext/flex  Wrist: normal bilateral symmetric ext/flex/sup/pro  Finger: Triggering of the right middle finger is noted.  He has a flexion contracture of 30 degrees of passively correctable   Vascular 2+ radial pulse       Median Nerve Exam Right   Phdurkan neg   Sensation normal   PCBr Sensation normal   APB Weakness No   Thenar Atrophy No     Ulnar Nerve Exam    Sensation normal   1st JEOVANNY Weakness No   Hypothenar Atrophy No     Radial Nerve Exam  Right Left   Radial Sensory normal normal       CC: Right carpal tunnel syndrome/trigger finger    HPI: This 69 year old right-hand-dominant male presents with numbness and tingling in the right hand.  He also ports intermittent locking.  Pain intensity is moderate.  Patient has tried finger brace and wrist brace.  Patient reports pain at nighttime and difficulty sleeping  secondary to the pain.  It started September 2023.    Occupation: Retired    Interval History: As of 3/7/2024 pain has showed slight improvement with minimal tingling and numbness. Patient still struggles with straightening out his right hand.     Past Medical History:   Diagnosis Date    Allergic reaction to bee sting     Cancer (HCC) 2010    prostate cancer - prostatecotomy    Colon polyps     Lumbar disc disease with radiculopathy     Personal history of malignant neoplasm of prostate 01/26/2012    T wave inversion in EKG 10/17/2020    Cardiology work-up 9 [facility] cardiologist 7/16/2012 had normal CT angiogram    Unspecified essential hypertension     Vitamin D deficiency      Past Surgical History:   Procedure Laterality Date    BACK SURGERY  1992    Laminectomy Lumbar L4 L5    BACK SURGERY  4/1/13    L4-S1 revision decomp with TLIF with Dr Herrera at [place]    COLONOSCOPY  2016    COLONOSCOPY N/A 2/28/2022    Procedure: COLONOSCOPY,;  Surgeon: Jovanny Tapia MD;  Location: [facility]    COLONOSCOPY,BIOPSY N/A 7/13/2016    Procedure: COLONOSCOPY, POSSIBLE BIOPSY, POSSIBLE POLYPECTOMY 28091;  Surgeon: Jovanny Tapia MD;  Location: [facility]    OTHER SURGICAL HISTORY      knee arthroscopy    OTHER SURGICAL HISTORY  1990    Ruptured Achilles Tendon    OTHER SURGICAL HISTORY      Prostate Surgery    TONSILLECTOMY      VASECTOMY  1990     Current Outpatient Medications   Medication Sig Dispense Refill    ketoconazole 2 % External Cream Apply 1 Application  topically daily. 30 g 0    rosuvastatin 5 MG Oral Tab Take 1 tablet (5 mg total) by mouth nightly. 90 tablet 3     No Known Allergies  Family History   Problem Relation Age of Onset    Other (Parkinson's Disease[other]) Mother     Cancer Father         Colon/Prostate    Other (Lupus Erythematosus[other]) Sister         Systemic     Social History     Occupational History    Not on file   Tobacco Use    Smoking status: Former      Packs/day: 0.00     Years: 0.00     Additional pack years: 0.00     Total pack years: 0.00     Types: Cigarettes     Start date: 1971     Quit date: 1984     Years since quittin.2    Smokeless tobacco: Never   Vaping Use    Vaping Use: Never used   Substance and Sexual Activity    Alcohol use: Yes     Alcohol/week: 10.0 standard drinks of alcohol     Types: 4 Glasses of wine, 6 Cans of beer per week    Drug use: Never    Sexual activity: Not on file        Review of Systems (negative unless bolded):  General: fevers, chills, fatigue  CV:  chest pain, palpitations, leg swelling  Msk: bodyaches, neck pain, neck stiffness  Skin: rashes, open wounds, nonhealing ulcers  Hem: bleeds easily, bruise easily, immunocompromised  Neuro: dizziness, light headedness, headaches  Psych: anxious, depressed, anger issues    Luis Antonio Arango MD   Hand, Wrist, & Elbow Surgery  [email or web address]  t: [phone]  f: [phone]

## 2024-04-18 ENCOUNTER — OFFICE VISIT (OUTPATIENT)
Dept: ORTHOPEDICS CLINIC | Facility: CLINIC | Age: 69
End: 2024-04-18
Payer: MEDICARE

## 2024-04-18 VITALS — BODY MASS INDEX: 26.48 KG/M2 | WEIGHT: 185 LBS | HEIGHT: 70 IN

## 2024-04-18 DIAGNOSIS — M65.331 TRIGGER MIDDLE FINGER OF RIGHT HAND: Primary | ICD-10-CM

## 2024-04-18 PROCEDURE — 99214 OFFICE O/P EST MOD 30 MIN: CPT | Performed by: ORTHOPAEDIC SURGERY

## 2024-04-18 NOTE — PROGRESS NOTES
Clinic Note [facility]     Assessment/Plan:  69 year old male    Right carpal tunnel syndrome- Symptoms have reoccurred. EMG/NCV ordered. Proceed with carpal tunnel release scheduled for May 20, 2024.  Triggering of right middle finger- s/p 2 CSI with persistent symptoms. Proceed with possible trigger finger release tentatively scheduled for May 20, 2024.    Follow Up: 3 weeks, After EMG/NCV test    Diagnostic Studies:  EMG/NCV: Deferred    Physical Exam:    Ht 5' 10\" (1.778 m)   Wt 185 lb (83.9 kg)   BMI 26.54 kg/m²     Constitutional: NAD. AOx3. Well-developed and Well-nourished.   Psychiatric: Normal mood/ affect/ behavior. Judgment and thought content normal.     Right upper Extremity:   Inspection: Skin Intact. No skin lesions. No gross deformity.   Palpation: Tenderness palpation of the A1 pulley   Motion: Elbow: normal bilateral symmetric ext/flex  Wrist: normal bilateral symmetric ext/flex/sup/pro  Finger: Triggering of the right middle finger is noted.  He has a flexion contracture of 30 degrees of passively correctable   Vascular 2+ radial pulse     Median Nerve Exam Right   Phdurkan neg   Sensation normal   PCBr Sensation normal   APB Weakness No   Thenar Atrophy No     Ulnar Nerve Exam    Sensation normal   1st JEOVANNY Weakness No   Hypothenar Atrophy No     Radial Nerve Exam  Right Left   Radial Sensory normal normal     CC: Right carpal tunnel syndrome/trigger finger    HPI: This 69 year old right-hand-dominant male presents with numbness and tingling in the right hand.  He also ports intermittent locking.  Pain intensity is moderate.  Patient has tried finger brace and wrist brace.  Patient reports pain at nighttime and difficulty sleeping secondary to the pain.  It started September 2023.    Occupation: Retired    Interval History: As of 3/7/2024 pain has showed slight improvement with minimal tingling and numbness. Patient still struggles with straightening out his right hand.     Interval Hx  (24): Patient complains having trouble sleeping or waking up with pins and needles in his fingers.    Past Medical History:    Allergic reaction to bee sting    Cancer (HCC)    prostate cancer - prostatecotomy    Colon polyps    Lumbar disc disease with radiculopathy    Personal history of malignant neoplasm of prostate    T wave inversion in EKG    Cardiology work-up 9 [facility] cardiologist 2012 had normal CT angiogram    Unspecified essential hypertension    Vitamin D deficiency     Past Surgical History:   Procedure Laterality Date    Back surgery      Laminectomy Lumbar L4 L5    Back surgery  13    L4-S1 revision decomp with TLIF with Dr Herrera at [place]    Colonoscopy  2016    Colonoscopy N/A 2022    Procedure: COLONOSCOPY,;  Surgeon: Jovanny Tapia MD;  Location: [facility]    Colonoscopy,biopsy N/A 2016    Procedure: COLONOSCOPY, POSSIBLE BIOPSY, POSSIBLE POLYPECTOMY 34631;  Surgeon: Jovanny Tapia MD;  Location: [facility]    Other surgical history      knee arthroscopy    Other surgical history      Ruptured Achilles Tendon    Other surgical history      Prostate Surgery    Tonsillectomy      Vasectomy       Current Outpatient Medications   Medication Sig Dispense Refill    ketoconazole 2 % External Cream Apply 1 Application  topically daily. 30 g 0    rosuvastatin 5 MG Oral Tab Take 1 tablet (5 mg total) by mouth nightly. 90 tablet 3     No Known Allergies  Family History   Problem Relation Age of Onset    Other (Parkinson's Disease[other]) Mother     Cancer Father         Colon/Prostate    Other (Lupus Erythematosus[other]) Sister         Systemic     Social History     Occupational History    Not on file   Tobacco Use    Smoking status: Former     Current packs/day: 0.00     Types: Cigarettes     Start date: 1971     Quit date: 1984     Years since quittin.3    Smokeless tobacco: Never   Vaping Use    Vaping status: Never  Used   Substance and Sexual Activity    Alcohol use: Yes     Alcohol/week: 10.0 standard drinks of alcohol     Types: 4 Glasses of wine, 6 Cans of beer per week    Drug use: Never    Sexual activity: Not on file      Review of Systems (negative unless bolded):  General: fevers, chills, fatigue  CV:  chest pain, palpitations, leg swelling  Msk: bodyaches, neck pain, neck stiffness  Skin: rashes, open wounds, nonhealing ulcers  Hem: bleeds easily, bruise easily, immunocompromised  Neuro: dizziness, light headedness, headaches  Psych: anxious, depressed, anger issues    Attention: This note has been scribed by Rosenda Mccullough under the supervision of Luis Antonio Arango MD.      Luis Antonio Arango MD   Hand, Wrist, & Elbow Surgery  [email or web address]  t: [phone]  f: [phone]

## 2024-05-08 ENCOUNTER — PROCEDURE VISIT (OUTPATIENT)
Dept: PHYSICAL MEDICINE AND REHAB | Facility: CLINIC | Age: 69
End: 2024-05-08
Payer: MEDICARE

## 2024-05-08 DIAGNOSIS — M65.331 TRIGGER MIDDLE FINGER OF RIGHT HAND: ICD-10-CM

## 2024-05-08 PROCEDURE — 95886 MUSC TEST DONE W/N TEST COMP: CPT | Performed by: PHYSICAL MEDICINE & REHABILITATION

## 2024-05-08 PROCEDURE — 95911 NRV CNDJ TEST 9-10 STUDIES: CPT | Performed by: PHYSICAL MEDICINE & REHABILITATION

## 2024-05-08 NOTE — PROCEDURES
[facility]  [address] [place] [address]        Full Name: Andrea Poole Gender: Male  Patient ID: [id number] YOB: 1955      Visit Date: 5/8/2024 11:07 AM  Age: 69 Years  Examining Physician: Dr. ABIEL Chi  Referring Physician: Dr. MIRZA Arango  Height: 5 feet 10 inch  Weight: 185 lbs  History: EMG-BUE. Denies history of DM, thyroid disease. 2 drinks 5 days weekly (alcohol use). + pain and N/T.     Endorses right middle finger trigger finger, he has tingling in this finger as well. He states a CSI didnt solve his issue. He has surgery for carpal tunnel later this month. He states that he also has symptoms on the left hand as well.       Sensory NCS      Nerve / Sites Rec. Site Onset Lat Peak Lat NP Amp PP Amp Segments Distance Velocity Comment     ms ms µV µV  cm m/s    R Median - Dig II (Antidromic)      Wrist Index 4.58 5.63 4.4 7.9 Wrist - Index 14 31    L Median - Dig II (Antidromic)      Wrist Index 3.96 5.57 4.4 9.9 Wrist - Index 14 35       Palm Index 4.74 5.52 6.7 8.9 Palm - Index 7 15    R Ulnar - Dig V (Antidromic)      Wrist Dig V 2.76 3.33 14.2 18.2 Wrist - Dig V 14 51    L Ulnar - Dig V (Antidromic)      Wrist Dig V 2.55 3.54 12.1 6.7 Wrist - Dig V 14 55    R Radial - Superficial (Antidromic)      Forearm Wrist 1.61 2.08 31.1 34.8 Forearm - Wrist 10 62    L Radial - Superficial (Antidromic)      Forearm Wrist 1.77 2.34 18.2 20.0 Forearm - Wrist 10 56        Motor NCS      Nerve / Sites Muscle Latency Amplitude Segments Dist. Lat Diff Velocity Comments     ms mV  cm ms m/s    R Median - APB      Wrist APB 5.13 6.8 Wrist - APB 8         Elbow APB 10.04 6.6 Elbow - Wrist 26 4.92 52.9    L Median - APB      Wrist APB 5.02 6.7 Wrist - APB 8         Elbow APB 9.69 6.1 Elbow - Wrist 25 4.67 53.6    R Ulnar - ADM      Wrist ADM 2.42 7.2 Wrist - ADM 8         B.Elbow ADM 6.35 7.4 B.Elbow - Wrist 25 3.94 63.5       A.Elbow ADM 8.65 7.2 A.Elbow - B.Elbow 11 2.29 48.0    L  Ulnar - ADM      Wrist ADM 2.65 9.2 Wrist - ADM 8         B.Elbow ADM 6.48 8.7 B.Elbow - Wrist 24 3.83 62.6       A.Elbow ADM 9.19 8.1 A.Elbow - B.Elbow 12 2.71 44.3        EMG Summary Table     Spontaneous MUAP Recruitment   Muscle IA Fib PSW Fasc H.F. Amp Dur. PPP Pattern   L. Deltoid N None None None None N N N N   R. Deltoid N None None None None N N N N   L. Triceps brachii N None None None None N N N N   R. Triceps brachii N None None None None N N N N   L. Biceps brachii N None None None None N N N N   R. Biceps brachii N None None None None N N N N   L. Pronator teres N None None None None N N N N   R. Pronator teres N None None None None N N N N   L. First dorsal interosseous N None None None None N N 1+ N   R. First dorsal interosseous N None None None None N N N N   L. Abductor pollicis brevis 1+ None None None None N N N N   R. Abductor pollicis brevis 1+ None None None None N N N N       Summary    The motor conduction test was performed on 4 nerve(s). The results were normal in 1 nerve(s): L Ulnar - ADM. Results outside the specified normal range were found in 3 nerve(s), as follows:  In the R Median - APB study  the take off latency result was increased for Wrist stimulation  In the L Median - APB study  the take off latency result was increased for Wrist stimulation  In the R Ulnar - ADM study  the peak amplitude result was reduced for Wrist stimulation    The sensory conduction test was performed on 6 nerve(s). The results were normal in 4 nerve(s): R Ulnar - Dig V (Antidromic), L Ulnar - Dig V (Antidromic), R Radial - Superficial (Antidromic), L Radial - Superficial (Antidromic). Results outside the specified normal range were found in 2 nerve(s), as follows:  In the R Median - Dig II (Antidromic) study  the peak latency result was increased for Wrist stimulation  the peak amplitude result was reduced for Wrist stimulation  In the L Median - Dig II (Antidromic) study  the peak latency result was  increased for Wrist stimulation  the peak amplitude result was reduced for Wrist stimulation  the peak latency result was increased for Palm stimulation    The needle EMG examination was performed in 12 muscles. It was normal in 9 muscle(s): L. Deltoid, R. Deltoid, L. Triceps brachii, R. Triceps brachii, L. Biceps brachii, R. Biceps brachii, L. Pronator teres, R. Pronator teres, R. First dorsal interosseous. The study was abnormal in 3 muscle(s), with the following distribution:  Abnormal spontaneous/insertional activity was found in L. Abductor pollicis brevis, R. Abductor pollicis brevis.  The MUP waveform abnormality was found in L. First dorsal interosseous.        Andrea Poole [id number] 5/8/2024 11:07 AM     3 of 4    Conclusion:     This is an abnormal study.    1.  There is electrodiagnostic evidence to support a bilateral median mononeuropathy at or distal to the wrist (carpal tunnel syndrome) electrically moderate bilaterally with active denervation changes noted on needle EMG in the abductor pollicis brevis muscles bilaterally.  2.  There is evidence to support mild bilateral ulnar mononeuropathies at the elbows evidenced by slowing across the elbow segment compared to the forearm segment bilaterally.  3.  There is no evidence to support a right or left-sided cervical (C5-T1) radiculopathy.        Eben Chi DO  Interventional Spine and Sports Medicine Specialist   Physical Medicine and Rehabilitation  [address] [address]    [address] [place] [address]        ________________________  Dr. ABIEL Poole [id number] 5/8/2024 11:07 AM     3 of 4

## 2024-05-13 ENCOUNTER — TELEPHONE (OUTPATIENT)
Dept: ORTHOPEDICS CLINIC | Facility: CLINIC | Age: 69
End: 2024-05-13

## 2024-05-15 NOTE — TELEPHONE ENCOUNTER
Dr Arango will call the patient today to discuss EMG results and next steps  Please let the patient know he will expect a call likely this afternoon  
EMG results in epic. Please advise if you want this scheduled at next available or if there is another date you were thinking of. Surgery is not yet scheduled.   
LMOM for patient stating he can expect a call from Dr. Arango this afternoon to go over EMG results. Will also send [employer] message   
Patient needs to go over EMG results with Dr. Arango before surgery. He is out of town all this week and was inquiring about a phone or video visit. Please advise  
H/O shoulder surgery  left shoulder replacement 2015  History of total right knee replacement (TKR)  2006  S/P CABG (coronary artery bypass graft)  x3 2004  S/P urological surgery  penile prosthetic placement  Stented coronary artery  1 stent in 10/2016

## 2024-05-21 ENCOUNTER — TELEPHONE (OUTPATIENT)
Dept: ORTHOPEDICS CLINIC | Facility: CLINIC | Age: 69
End: 2024-05-21

## 2024-05-21 NOTE — TELEPHONE ENCOUNTER
Patient called and stated he was supposed to come in and see you to go over EMG results tomorrow. You are not in clinic. Please advise how to move forward.

## 2024-05-21 NOTE — TELEPHONE ENCOUNTER
Patient is requesting to know when he can come in to go over EMG results with Dr. Arango. Please advise.

## 2024-06-10 ENCOUNTER — TELEPHONE (OUTPATIENT)
Dept: ORTHOPEDICS CLINIC | Facility: CLINIC | Age: 69
End: 2024-06-10

## 2024-06-10 DIAGNOSIS — G56.01 CARPAL TUNNEL SYNDROME OF RIGHT WRIST: ICD-10-CM

## 2024-06-10 DIAGNOSIS — M65.331 TRIGGER MIDDLE FINGER OF RIGHT HAND: Primary | ICD-10-CM

## 2024-06-10 NOTE — TELEPHONE ENCOUNTER
Date of Surgery: 24       Post Op Appt: 24 @ 0700    Case ID: [id number]    Notes:       SURGICAL BOOKING SHEET   Name: Andrea Poole  MRN: [id number]   : 1955     Surgical Date:    24   Surgical Consent:    Right endoscopic carpal tunnel release, and A1 pulley release of right middle finger   Diagnosis:         ICD-10-CM   1. Trigger middle finger of right hand  M65.331   2. Carpal tunnel syndrome of right wrist  G56.01       Workers Comp: No   Procedure Codes:    Endoscopic carpal tunnel release (CPT 91075)  Trigger Finger Release (33880)   Disposition:    Outpatient   Operative Time:    15 mins   Antibiotics:    Ancef 2g   Anesthesia Type:    Monitored Anesthesia Care (MAC)   Clearance:     None   Equipment:    ECTR: [place] Blade, Microaire Endoscopic System, Local Pre-Mix (1% lidocaine w/ epi, 0.5% marcaine, and 8.4% NaBicarb) , 5-0 moncryl, Exofin, Telfa+Tegaderm   Standby: Lead Hand, 4-0 Nylon PS-2   Assistant:    Nba Assistant: Micky, Soledad Valencia PA-C   Follow Up:    10-14 days post op with Soledad   Pain Medication:    TBD   Therapy:    [facility]

## 2024-06-10 NOTE — TELEPHONE ENCOUNTER
Called and spoke with Andrea in regards to his upcoming surgery. I confirmed that surgery will take place at [facility]. I also discussed the surgical protocol and scheduled his post op appt. He states understanding with no questions or concerns. Advised to give the office a call back if he has any further questions or concerns.

## 2024-06-11 ENCOUNTER — TELEPHONE (OUTPATIENT)
Dept: ORTHOPEDICS CLINIC | Facility: CLINIC | Age: 69
End: 2024-06-11

## 2024-06-11 ENCOUNTER — TELEPHONE (OUTPATIENT)
Dept: PHYSICAL THERAPY | Facility: HOSPITAL | Age: 69
End: 2024-06-11

## 2024-06-11 ENCOUNTER — ORDER TRANSCRIPTION (OUTPATIENT)
Dept: PHYSICAL THERAPY | Facility: HOSPITAL | Age: 69
End: 2024-06-11

## 2024-06-11 DIAGNOSIS — Z98.890 STATUS POST CARPAL TUNNEL RELEASE: Primary | ICD-10-CM

## 2024-06-12 ENCOUNTER — TELEPHONE (OUTPATIENT)
Dept: ORTHOPEDICS CLINIC | Facility: CLINIC | Age: 69
End: 2024-06-12

## 2024-06-12 NOTE — TELEPHONE ENCOUNTER
Patient had surgery 6/11 and has a post op scheduled on 6/28. Said he is supposed to come in 10-14 days after surgery. Please advise if patient should come in sooner and where to schedule.  Future Appointments   Date Time Provider Department Center   6/28/2024  7:00 AM Soled[facility] Archuleta [id number]

## 2024-06-14 ENCOUNTER — LAB ENCOUNTER (OUTPATIENT)
Dept: LAB | Age: 69
End: 2024-06-14
Attending: FAMILY MEDICINE
Payer: MEDICARE

## 2024-06-14 DIAGNOSIS — R73.9 HYPERGLYCEMIA: ICD-10-CM

## 2024-06-14 LAB
EST. AVERAGE GLUCOSE BLD GHB EST-MCNC: 126 MG/DL (ref 68–126)
HBA1C MFR BLD: 6 % (ref ?–5.7)

## 2024-06-14 PROCEDURE — 83036 HEMOGLOBIN GLYCOSYLATED A1C: CPT

## 2024-06-14 PROCEDURE — 36415 COLL VENOUS BLD VENIPUNCTURE: CPT

## 2024-06-14 NOTE — PROGRESS NOTES
Hemoglobin A1c  indicates stable prediabetes continue to reduce the simple and complex carbohydrates including the white bread, rice and pasta. Increase vegetables, fruits, protein and substitute complex carbohydrates with higher fiber/grained carbohydrates.  Repeat the hemoglobin A1c in 6 months.

## 2024-06-17 ENCOUNTER — TELEPHONE (OUTPATIENT)
Dept: PHYSICAL THERAPY | Facility: HOSPITAL | Age: 69
End: 2024-06-17

## 2024-06-18 ENCOUNTER — OFFICE VISIT (OUTPATIENT)
Dept: OCCUPATIONAL MEDICINE | Facility: HOSPITAL | Age: 69
End: 2024-06-18
Attending: PHYSICIAN ASSISTANT

## 2024-06-18 DIAGNOSIS — Z98.890 STATUS POST CARPAL TUNNEL RELEASE: Primary | ICD-10-CM

## 2024-06-18 PROCEDURE — 97166 OT EVAL MOD COMPLEX 45 MIN: CPT

## 2024-06-18 PROCEDURE — 97110 THERAPEUTIC EXERCISES: CPT

## 2024-06-18 NOTE — PROGRESS NOTES
OCCUPATIONAL THERAPY UPPER EXTREMITY EVALUATION     Diagnosis:   Status post carpal tunnel release (Z98.890)       Referring Provider: Soledad Archuleta  Date of Evaluation:    6/18/2024    Precautions:   Post-Operative Protocol Next MD visit:   6/24/2024  Date of Surgery: 6/11/2024     PATIENT SUMMARY   Andrea Poole is a 69 year old male who presents to therapy today with complaints of need for post-operative occupational therapy to address recent R carpal tunnel release surgery and R middle finger A1 pulley trigger finger release surgery completed on 6/11/2024. Patient presents about 1 week post-op today. Patient denies any pain or numbness/tingling although does note R middle finger stiffness. OT services received physician's orders for evaluation and treatment as indicated to maximize rehab potential.  Pt describes pain level current 0/10, at best 0/10, at worst 0/10. Patient denies any pain or numbness/tingling.  Current functional limitations include functional ADL/IADL management, gripping, lifting, yoga, gripping handlebars for riding bicycle, and overall functional use of RUE.    Andrea describes prior level of function as functionally (I) w/ all ADL/IADLs.   Employment: Retired  Hand Dominance: right  Living Situation: Family  Imaging/Tests: n/a  Pt goals include improve function in R hand to return to activities such as bike riding and yoga.  Past medical history was reviewed with Andrea. Significant findings include R carpal tunnel syndrome and R middle trigger finger.    ASSESSMENT  Andrea presents to occupational therapy evaluation with primary c/o need for post-operative occupational therapy to address recent R carpal tunnel release surgery and R middle finger A1 pulley trigger finger release surgery completed on 6/11/2024. Patient presents about 1 week post-op today. Patient denies any pain or numbness/tingling although does note R middle finger stiffness. OT services received physician's orders for  evaluation and treatment as indicated to maximize rehab potential. The results of the objective tests and measures show decreased ROM and need for post-operative care.  Functional deficits include but are not limited to functional ADL/IADL management, gripping, lifting, yoga, gripping handlebars for riding bicycle, and overall functional use of RUE. Signs and symptoms are consistent with diagnosis of s/p carpal tunnel release. Pt and OT discussed evaluation findings, pathology, POC and HEP.  Pt voiced understanding and performs HEP correctly without reported pain. Skilled Occupational Therapy is medically necessary to address the above impairments and reach functional goals.     OBJECTIVE    OBSERVATION: Patient noted with well-healing post-operative scar tissue areas to RUE. Patient notes symptoms of trigger finger and numbness/tingling related to carpal tunnel syndrome have resolved since surgery.    ORTHOTICS: none    SCAR: Patient noted with R carpal tunnel surgical scar healing well with no signs of infection. R middle digit A1 pulley area scar appears to be healing well with sutures in place. Patient educated on scar massage.    SENSORY: Patient denies any numbness/tingling.    CIRCUMFERENTIAL EDEMA (cm):  Wrist Crease: R=18.2cm; L=18.0cm    ROM: (* denotes performed with pain)  Elbow Wrist   Supination: R 90, L 90  Pronation: R 90, L 90 Flexion: R 70, L 70  Extension: R 60, L 65  Ulnar Deviation: R 45, L 45  Radial Deviation R 15, L 15     RIGHT HAND:    Thumb IF MF RF SF   MP 0-60 0-60 0-60 0-70 0-85   PIP IP=0-60 0-90 15-90 0-90 0-90   DIP  0-65 0-65 0-70 0-70   FITZGERALD 120         RUE Thumb   Palmar Abduction 0-70   Radial Abduction 0-70     STRENGTH: MMT, , and pinch measurements deferred at this visit. To be assessed paola next appropriate    SPECIAL TESTS:   Nine-Hole Peg Test: NT    Today’s Treatment and Response:   Pt education was provided on exam findings, treatment diagnosis, treatment plan,  expectations, and prognosis. Pt was also provided recommendations for use of moist heat/ice, scar management, and HEP exercises.  Patient was instructed in and issued a HEP for:   -Digit Flexion Tendon Gliding Exercises  -Thumb ROM Exercises  -R Middle Digit PIP/DIP Joint Blocking Flexion/Extension Exercises  -Wrist ROM Exercises    Charges: OT Eval: Moderate Complexity, TEx1      Total Timed Treatment: 30 min     Total Treatment Time: 30 min     Based on clinical rationale and outcome measures, this evaluation involved Moderate Complexity decision making due to 1-2 personal factors/comorbidities, 3 body structures involved/activity limitations, and evolving symptoms including  recovery s/p R carpal tunnel release surgery .  PLAN OF CARE   Goals: (to be met in 12 visits)   1. Patient will demonstrate WFL R middle finger digit flexion/extension indicating increased ability to manage functional ADL/IADLs.  2. Patient will be independent with scar management and swelling management techniques indicating increased ability to manage functional ADL/IADLs.  3. Patient will demonstrate at least equal bilateral  strength indicating increased ability to manage functional ADL/IADLs.  4. Patient will be independent and compliant with comprehensive HEP to maintain progress achieved in OT.    Frequency / Duration: Patient will be seen for 2x/week or a total of 12 visits over a 90 day period.  Treatment will include: Manual Therapy, Neuromuscular Re-education, Self-Care Home Management, Therapeutic Activities, Therapeutic Exercise, and Home Exercise Program instruction, Splinting, Modalities PRN    Education or treatment limitation: None  Rehab Potential:good    QuickDASH Outcome Score  Score: 20.45 % (6/18/2024  1:03 PM)      Patient/Family/Caregiver was advised of these findings, precautions, and treatment options and has agreed to actively participate in planning and for this course of care.    Thank you for your referral.  Please co-sign or sign and return this letter via fax as soon as possible to [phone]. If you have any questions, please contact me at Dept: [phone]    Sincerely,  Electronically signed by therapist: Rishi Bello, OT  Physician's certification required: Yes  I certify the need for these services furnished under this plan of treatment and while under my care.    X___________________________________________________ Date____________________    Certification From: 6/18/2024  To:9/16/2024

## 2024-06-24 ENCOUNTER — OFFICE VISIT (OUTPATIENT)
Dept: ORTHOPEDICS CLINIC | Facility: CLINIC | Age: 69
End: 2024-06-24

## 2024-06-24 VITALS — WEIGHT: 185 LBS | BODY MASS INDEX: 25.9 KG/M2 | HEIGHT: 71 IN

## 2024-06-24 DIAGNOSIS — M65.331 TRIGGER MIDDLE FINGER OF RIGHT HAND: Primary | ICD-10-CM

## 2024-06-24 DIAGNOSIS — G56.01 CARPAL TUNNEL SYNDROME OF RIGHT WRIST: ICD-10-CM

## 2024-06-24 NOTE — PROGRESS NOTES
Clinic Note [facility]     Assessment/Plan:  69 year old male    Right carpal tunnel release and middle finger A1 pulley release 6/11/2024-I removed his stitches today.  He can come continue with range of motion and strengthening.  His numbness and tingling is resolved and he is very happy about that.  All of his questions were answered.  Left carpal tunnel syndrome-EMG confirmed-we discussed moving forward with endoscopic but tunnel release possible open at his convenience.  He will contact us when ready.  Left middle finger trigger finger-we discussed moving forward with A1 pulley release at his convenience.  He is to discuss that previously with Dr. Arango.  We also try cortisone injection.    Follow Up: 6 weeks but can cancel if better    Diagnostic Studies:  EMG/NCV: Deferred    Physical Exam:    Ht 5' 11\" (1.803 m)   Wt 185 lb (83.9 kg)   BMI 25.80 kg/m²     Constitutional: NAD. AOx3. Well-developed and Well-nourished.   Psychiatric: Normal mood/ affect/ behavior. Judgment and thought content normal.     Right upper Extremity:   Inspection: Skin Intact. No skin lesions. No gross deformity.   Palpation: Tenderness palpation of the A1 pulley at left middle finger   Motion: Elbow: normal bilateral symmetric ext/flex  Wrist: normal bilateral symmetric ext/flex/sup/pro  Finger: Triggering of the right middle finger is noted.  He has a flexion contracture of 30 degrees of passively correctable   Vascular 2+ radial pulse     Median Nerve Exam Right   Phdurkan neg   Sensation normal   PCBr Sensation normal   APB Weakness No   Thenar Atrophy No     Ulnar Nerve Exam    Sensation normal   1st JEOVANNY Weakness No   Hypothenar Atrophy No     Radial Nerve Exam  Right Left   Radial Sensory normal normal     CC: Right carpal tunnel syndrome/trigger finger    HPI: This 69 year old right-hand-dominant male presents with numbness and tingling in the right hand.  He also ports intermittent locking.  Pain intensity is  moderate.  Patient has tried finger brace and wrist brace.  Patient reports pain at nighttime and difficulty sleeping secondary to the pain.  It started September 2023.    Occupation: Retired    Interval History: Patient underwent carpal tunnel release and trigger finger release.  His postop pain is improving.  He is continue to have numbness and tingling at night on the left side as well as triggering at the middle finger.    Past Medical History:    Allergic reaction to bee sting    Cancer (HCC)    prostate cancer - prostatecotomy    Colon polyps    Lumbar disc disease with radiculopathy    Personal history of malignant neoplasm of prostate    T wave inversion in EKG    Cardiology work-up [facility] cardiologist 7/16/2012 had normal CT angiogram    Unspecified essential hypertension    Vitamin D deficiency     Past Surgical History:   Procedure Laterality Date    Back surgery  1992    Laminectomy Lumbar L4 L5    Back surgery  4/1/13    L4-S1 revision decomp with TLIF with Dr Herrera at [place]    Colonoscopy  2016    Colonoscopy N/A 2/28/2022    Procedure: COLONOSCOPY,;  Surgeon: Jovanny Tapia MD;  Location: [facility]    Colonoscopy,biopsy N/A 7/13/2016    Procedure: COLONOSCOPY, POSSIBLE BIOPSY, POSSIBLE POLYPECTOMY 03836;  Surgeon: Jovanny Tapia MD;  Location: [facility]    Other surgical history      knee arthroscopy    Other surgical history  1990    Ruptured Achilles Tendon    Other surgical history      Prostate Surgery    Tonsillectomy      Vasectomy  1990     Current Outpatient Medications   Medication Sig Dispense Refill    ketoconazole 2 % External Cream Apply 1 Application  topically daily. 30 g 0    rosuvastatin 5 MG Oral Tab Take 1 tablet (5 mg total) by mouth nightly. 90 tablet 3     No Known Allergies  Family History   Problem Relation Age of Onset    Other (Parkinson's Disease[other]) Mother     Cancer Father         Colon/Prostate    Other (Lupus  Erythematosus[other]) Sister         Systemic     Social History     Occupational History    Not on file   Tobacco Use    Smoking status: Former     Current packs/day: 0.00     Types: Cigarettes     Start date: 1971     Quit date: 1984     Years since quittin.5    Smokeless tobacco: Never   Vaping Use    Vaping status: Never Used   Substance and Sexual Activity    Alcohol use: Yes     Alcohol/week: 10.0 standard drinks of alcohol     Types: 4 Glasses of wine, 6 Cans of beer per week    Drug use: Never    Sexual activity: Not on file      Review of Systems (negative unless bolded):  General: fevers, chills, fatigue  CV:  chest pain, palpitations, leg swelling  Msk: bodyaches, neck pain, neck stiffness  Skin: rashes, open wounds, nonhealing ulcers  Hem: bleeds easily, bruise easily, immunocompromised  Neuro: dizziness, light headedness, headaches  Psych: anxious, depressed, anger issues  Soledad Archuleta PA-C  Hand, Wrist, & Elbow Surgery  Physician Assistant to Dr. Luis Antonio [email or web address]  t: [phone]  f: [phone]

## 2024-06-26 ENCOUNTER — OFFICE VISIT (OUTPATIENT)
Dept: OCCUPATIONAL MEDICINE | Facility: HOSPITAL | Age: 69
End: 2024-06-26
Attending: PHYSICIAN ASSISTANT

## 2024-06-26 PROCEDURE — 97110 THERAPEUTIC EXERCISES: CPT

## 2024-06-26 NOTE — PROGRESS NOTES
Diagnosis:   Status post carpal tunnel release (Z98.890)       Referring Provider: Soledad Archuleta  Date of Evaluation:    6/18/2024    Precautions:   Post Operative Protocol Next MD visit:   none scheduled  Date of Surgery: 6/11/2024   Insurance Primary/Secondary: ADRIAN MCR / N/A     # Auth Visits: 2/12            Subjective: Patient presents to OT appt today noting he has been completing home exercises. Reports no pain to RUE today and had stitches removed this past Monday. Splinting at nighttime to assist with digit extension of R D3 PIP joint.      Objective:     SCAR: Patient R trigger finger middle finger A1 scar area and carpal tunnel scar areas appear to be healing well with no significant adhesions noted.     CIRCUMFERENTIAL EDEMA (cm):  Wrist Crease: R=18.2cm; L=18.0cm     ROM: (* denotes performed with pain)  Elbow Wrist   Supination: R 90, L 90  Pronation: R 90, L 90 Flexion: R 70, L 70  Extension: R 60, L 65  Ulnar Deviation: R 45, L 45  Radial Deviation R 15, L 15      RIGHT HAND:     Thumb IF MF RF SF   MP 0-60 0-70 0-70 0-70 0-85   PIP IP=0-60 0-90 15-90 0-90 0-90   DIP   0-65 0-65 0-70 0-70   FITZGERALD 120 225  210 230  230       RUE Thumb   Palmar Abduction 0-70   Radial Abduction 0-70          Strength: R=27.2 lbs; L=42.2 lbs  3-Point Pinch: R=6.0 lbs; L=11.0 lbs  2-Point Pinch: R=7.0 lbs; L=9.0 lbs   Lateral Pinch: R=13.0 lbs; L=17.0 lbs      SPECIAL TESTS:   Nine-Hole Peg Test: R=23.4 sec; L=22.1 sec      Assessment: Patient RUE digit and wrist ROM improving gradually and scar appears to be well-managed. Patient educated on light strengthening exercises for /pinch and wrist strengthening exercises. Patient to continue with OT and HEP at home at this time.      Goals: (to be met in 12 visits)   1. Patient will demonstrate WFL R middle finger digit flexion/extension indicating increased ability to manage functional ADL/IADLs.  2. Patient will be independent with scar management and swelling  management techniques indicating increased ability to manage functional ADL/IADLs.  3. Patient will demonstrate at least equal bilateral  strength indicating increased ability to manage functional ADL/IADLs.  4. Patient will be independent and compliant with comprehensive HEP to maintain progress achieved in OT.     Plan: Patient will continue to be seen for 2x/week or a total of 12 visits over a 90 day period.  Treatment will include: Manual Therapy, Neuromuscular Re-education, Self-Care Home Management, Therapeutic Activities, Therapeutic Exercise, and Home Exercise Program instruction, Splinting, Modalities PRN    Date: 6/26/2024  TX#: 2/12 Date:                 TX#: 3/12 Date:                 TX#: 4/12 Date:                 TX#: 5/12 Date:   Tx#: 6/12   -3 min hot pack to RUE hand  -Assessment of Objective Measures and , pinch, and Nine-Hole Peg Test  -IASTM Scar tissue management to R volar A1 pulley of R D3 and carpal tunnel scar area  -Joint Blocking FDS/FDP Exercises  -Yellow Theraputty Strengthening Exercises  -2-3 lb wrist eccentric strengthening exercises  -Hammer Pronation/Supination exercises       HEP:   -Digit Flexion Tendon Gliding Exercises  -Thumb ROM Exercises  -R Middle Digit PIP/DIP Joint Blocking Flexion/Extension Exercises  -Wrist ROM Exercises  -Yellow Theraputty Strengthening Exercises  -2-3 lb Wrist Eccentric Exercises    Charges: TEx2       Total Timed Treatment: 30 min  Total Treatment Time: 30 min

## 2024-06-28 ENCOUNTER — OFFICE VISIT (OUTPATIENT)
Dept: OCCUPATIONAL MEDICINE | Facility: HOSPITAL | Age: 69
End: 2024-06-28
Attending: PHYSICIAN ASSISTANT

## 2024-06-28 PROCEDURE — 97140 MANUAL THERAPY 1/> REGIONS: CPT

## 2024-06-28 PROCEDURE — 97110 THERAPEUTIC EXERCISES: CPT

## 2024-06-28 NOTE — PROGRESS NOTES
Diagnosis:   Status post carpal tunnel release (Z98.890)       Referring Provider: Soledad Archuleta  Date of Evaluation:    6/18/2024    Precautions:   Post Operative Protocol Next MD visit:   none scheduled  Date of Surgery: 6/11/2024   Insurance Primary/Secondary: ADRIAN MCR / N/A     # Auth Visits: 3/12            Subjective: Patient reports no pain but some stiffness in RUE, specifically with R middle finger. Patient reports that he is completing home exercises but did not wear the nighttime splint last night.    Objective:     SCAR: Patient R trigger finger middle finger A1 scar area and carpal tunnel scar areas appear to be healing well with no significant adhesions noted.     CIRCUMFERENTIAL EDEMA (cm):  Wrist Crease: R=18.2cm; L=18.0cm     ROM: (* denotes performed with pain)  Elbow Wrist   Supination: R 90, L 90  Pronation: R 90, L 90 Flexion: R 70, L 70  Extension: R 60, L 65  Ulnar Deviation: R 45, L 45  Radial Deviation R 15, L 15      RIGHT HAND:     Thumb IF MF RF SF   MP 0-60 0-70 0-70 0-70 0-85   PIP IP=0-60 0-90 15-90 0-90 0-90   DIP   0-65 0-65 0-70 0-70   FITZGERALD 120 225  210 230  230       RUE Thumb   Palmar Abduction 0-70   Radial Abduction 0-70          Strength: R=27.2 lbs; L=42.2 lbs  3-Point Pinch: R=6.0 lbs; L=11.0 lbs  2-Point Pinch: R=7.0 lbs; L=9.0 lbs   Lateral Pinch: R=13.0 lbs; L=17.0 lbs      SPECIAL TESTS:   Nine-Hole Peg Test: R=23.4 sec; L=22.1 sec      Assessment: Patient RUE goals progressing well with digit and wrist ROM improved. The scar appears to be well-managed and has no notable adhesions. Patient was educated on the use of wearing the splint for his R middle finger at night to help with alignment of DIP joint. Patient was also educated on watching how much the patient can lift, and since there are no weight precautions the patient can lift up to comfort level. Overall ROM and strength in R hand and wrist has improved with focus on /pinch strength and endurance. Patient  is to continue with HEP and OT at this time to promote further improvement in independence and function.    Goals: (to be met in 12 visits)   1. Patient will demonstrate WFL R middle finger digit flexion/extension indicating increased ability to manage functional ADL/IADLs.  2. Patient will be independent with scar management and swelling management techniques indicating increased ability to manage functional ADL/IADLs.  3. Patient will demonstrate at least equal bilateral  strength indicating increased ability to manage functional ADL/IADLs.  4. Patient will be independent and compliant with comprehensive HEP to maintain progress achieved in OT.     Plan: Patient will continue to be seen for 2x/week or a total of 12 visits over a 90 day period.  Treatment will include: Manual Therapy, Neuromuscular Re-education, Self-Care Home Management, Therapeutic Activities, Therapeutic Exercise, and Home Exercise Program instruction, Splinting, Modalities PRN    Date: 6/26/2024  TX#: 2/12 Date: 6/28/2024           TX#: 3/12 Date:                 TX#: 4/12 Date:                 TX#: 5/12 Date:   Tx#: 6/12   -3 min hot pack to RUE hand  -Assessment of Objective Measures and , pinch, and Nine-Hole Peg Test  -IASTM Scar tissue management to R volar A1 pulley of R D3 and carpal tunnel scar area  -Joint Blocking FDS/FDP Exercises  -Yellow Theraputty Strengthening Exercises  -2-3 lb wrist eccentric strengthening exercises  -Hammer Pronation/Supination exercises - 5 min hot pack RUE  - PROM/stretching and joint mobilization of the wrist and digits of R hand and wrist.  - IASTM Scar tissue management to R volar A1 pulley of R D3.  - Green/Red Flexbar Exercises 1 set of 10 reps for wrist flexion, wrist extension, supination, and pronation.  - Blue Digiflex Full  R hand 1 set of 10 reps with a 5 sec hold. Red Digiflex Individual Finger R hand with 1 set of 10 reps.  - Red  Clip to  [place]cala marbles with R hand to  improve pinch strength. Places marbles back into back through alternating fingers with a two point pinch.      HEP:   -Digit Flexion Tendon Gliding Exercises  -Thumb ROM Exercises  -R Middle Digit PIP/DIP Joint Blocking Flexion/Extension Exercises  -Wrist ROM Exercises  -Yellow Theraputty Strengthening Exercises  -2-3 lb Wrist Eccentric Exercises    Charges: MTx1, TEx1      Total Timed Treatment: 30 min  Total Treatment Time: 30 min    Written by Student OT: Nidia Mistry  Cosigned by OTR/L: Rishi Bello

## 2024-07-02 ENCOUNTER — APPOINTMENT (OUTPATIENT)
Dept: OCCUPATIONAL MEDICINE | Facility: HOSPITAL | Age: 69
End: 2024-07-02
Attending: PHYSICIAN ASSISTANT
Payer: MEDICARE

## 2024-07-08 ENCOUNTER — OFFICE VISIT (OUTPATIENT)
Dept: OCCUPATIONAL MEDICINE | Facility: HOSPITAL | Age: 69
End: 2024-07-08
Attending: PHYSICIAN ASSISTANT
Payer: MEDICARE

## 2024-07-08 PROCEDURE — 97110 THERAPEUTIC EXERCISES: CPT

## 2024-07-08 PROCEDURE — 97140 MANUAL THERAPY 1/> REGIONS: CPT

## 2024-07-08 NOTE — PROGRESS NOTES
Discharge Summary  Diagnosis:   Status post carpal tunnel release (Z98.890)       Referring Provider: Soledad Archuleta  Date of Evaluation:    6/18/2024    Precautions:  Post Operative Protocol Next MD visit:   none scheduled  Date of Surgery: 6/11/2024   Insurance Primary/Secondary: AETNIXON MCR / N/A     # Auth Visits: 4/12         Pt has attended 4 visits in Occupational Therapy.     Subjective: Patient presents to OT appt today noting that he feels R hand has returned to normal functioning. Patient notes no pain and has been diligent with scar massage and home exercise completion.    Assessment: Patient progressing well in terms of post R trigger finger release and carpal tunnel release management. [place] ROM although noted 10 degree extension lag. Patient educated and continues to splint in extension at nighttime as well as complete ROM exercises focusing on digit extension. Patient noted with improved /pinch strength and equipped with red theraputty strengthening exercises today. Scar management and swelling/edema well-controlled at this time. Patient would be appropriate to discharge from formal OT at this time and continue with established HEP.    Objective:     SCAR: Patient R trigger finger middle finger A1 scar area and carpal tunnel scar areas appear to be healing well with no significant adhesions noted. Patient diligent with scar management.     CIRCUMFERENTIAL EDEMA (cm):  Wrist Crease: R=18.1cm; L=18.0cm     ROM: (* denotes performed with pain)  Elbow Wrist   Supination: R 90, L 90  Pronation: R 90, L 90 Flexion: R 70, L 70  Extension: R 65, L 65  Ulnar Deviation: R 45, L 45  Radial Deviation R 15, L 15      RIGHT HAND:     Thumb IF MF RF SF   MP 0-60 0-70 0-70 0-70 0-85   PIP IP=0-60 0-90 10-90 0-90 0-90   DIP   0-65 0-65 0-70 0-70   FITZGERALD 120 225  215 230  230       RUE Thumb   Palmar Abduction 0-70   Radial Abduction 0-70          Strength: R=34.5 lbs; L=42.2 lbs  3-Point Pinch: R=8.0 lbs;  L=11.0 lbs  2-Point Pinch: R=7.0 lbs; L=9.0 lbs   Lateral Pinch: R=13.0 lbs; L=17.0 lbs      SPECIAL TESTS:   Nine-Hole Peg Test: R=23.4 sec; L=22.1 sec    Goals:  1. Patient will demonstrate WFL R middle finger digit flexion/extension indicating increased ability to manage functional ADL/IADLs. DISCONTINUE  2. Patient will be independent with scar management and swelling management techniques indicating increased ability to manage functional ADL/IADLs. MET  3. Patient will demonstrate at least equal bilateral  strength indicating increased ability to manage functional ADL/IADLs. DISCONTINUE  4. Patient will be independent and compliant with comprehensive HEP to maintain progress achieved in OT. MET    Plan: Patient to discharge from formal OT POC at this time and continue with established HEP.    Patient/Family/Caregiver was advised of these findings, precautions, and treatment options and has agreed to actively participate in planning and for this course of care.    Thank you for your referral. If you have any questions, please contact me at Dept: [phone].    Sincerely,  Electronically signed by therapist: Rishi Bello OT      Certification From: 7/8/2024  To:10/6/2024      Date: 6/26/2024  TX#: 2/12 Date: 6/28/2024           TX#: 3/12 Date: 7/8/2024                TX#: 4/12 Date:                 TX#: 5/12 Date:   Tx#: 6/12   -3 min hot pack to RUE hand  -Assessment of Objective Measures and , pinch, and Nine-Hole Peg Test  -IASTM Scar tissue management to R volar A1 pulley of R D3 and carpal tunnel scar area  -Joint Blocking FDS/FDP Exercises  -Yellow Theraputty Strengthening Exercises  -2-3 lb wrist eccentric strengthening exercises  -Hammer Pronation/Supination exercises - 5 min hot pack RUE  - PROM/stretching and joint mobilization of the wrist and digits of R hand and wrist.  - IASTM Scar tissue management to R volar A1 pulley of R D3.  - Green/Red Flexbar Exercises 1 set of 10 reps for wrist  flexion, wrist extension, supination, and pronation.  - Blue Digiflex Full  R hand 1 set of 10 reps with a 5 sec hold. Red Digiflex Individual Finger R hand with 1 set of 10 reps.  - Red  Clip to  mancala marbles with R hand to improve pinch strength. Places marbles back into back through alternating fingers with a two point pinch. -5 min hot pack RUE  - PROM/stretching and joint mobilization of the wrist and digits of R hand and wrist.  - IASTM Scar tissue management to R volar A1 pulley of R D3  -Red Theraputty Strengthening Exercises  -Review of HEP and POC     HEP:   -Digit Flexion Tendon Gliding Exercises  -Thumb ROM Exercises  -R Middle Digit PIP/DIP Joint Blocking Flexion/Extension Exercises  -Wrist ROM Exercises  -Yellow/Red Theraputty Strengthening Exercises  -2-3 lb Wrist Eccentric Exercises    Charges: MTx1, TEx1      Total Timed Treatment: 25 min  Total Treatment Time: 25 min

## 2024-07-10 ENCOUNTER — APPOINTMENT (OUTPATIENT)
Dept: OCCUPATIONAL MEDICINE | Facility: HOSPITAL | Age: 69
End: 2024-07-10
Attending: PHYSICIAN ASSISTANT
Payer: MEDICARE

## 2024-07-16 ENCOUNTER — APPOINTMENT (OUTPATIENT)
Dept: OCCUPATIONAL MEDICINE | Facility: HOSPITAL | Age: 69
End: 2024-07-16
Attending: PHYSICIAN ASSISTANT
Payer: MEDICARE

## 2024-07-19 ENCOUNTER — APPOINTMENT (OUTPATIENT)
Dept: OCCUPATIONAL MEDICINE | Facility: HOSPITAL | Age: 69
End: 2024-07-19
Attending: PHYSICIAN ASSISTANT
Payer: MEDICARE

## 2024-07-23 ENCOUNTER — APPOINTMENT (OUTPATIENT)
Dept: OCCUPATIONAL MEDICINE | Facility: HOSPITAL | Age: 69
End: 2024-07-23
Attending: PHYSICIAN ASSISTANT
Payer: MEDICARE

## 2024-07-26 ENCOUNTER — APPOINTMENT (OUTPATIENT)
Dept: OCCUPATIONAL MEDICINE | Facility: HOSPITAL | Age: 69
End: 2024-07-26
Attending: PHYSICIAN ASSISTANT
Payer: MEDICARE

## 2024-07-30 ENCOUNTER — APPOINTMENT (OUTPATIENT)
Dept: OCCUPATIONAL MEDICINE | Facility: HOSPITAL | Age: 69
End: 2024-07-30
Attending: PHYSICIAN ASSISTANT
Payer: MEDICARE

## 2024-08-02 ENCOUNTER — APPOINTMENT (OUTPATIENT)
Dept: OCCUPATIONAL MEDICINE | Facility: HOSPITAL | Age: 69
End: 2024-08-02
Attending: PHYSICIAN ASSISTANT
Payer: MEDICARE

## 2024-09-19 ENCOUNTER — TELEPHONE (OUTPATIENT)
Dept: ORTHOPEDICS CLINIC | Facility: CLINIC | Age: 69
End: 2024-09-19

## 2024-09-19 NOTE — TELEPHONE ENCOUNTER
Left message with patients wife. If patient wants to have surgery for the LT carpal tunnel we can cancel the visit and notify Dr. Arango/Soledad that he would like to move forward with surgery.

## 2024-09-23 ENCOUNTER — OFFICE VISIT (OUTPATIENT)
Dept: ORTHOPEDICS CLINIC | Facility: CLINIC | Age: 69
End: 2024-09-23
Payer: MEDICARE

## 2024-09-23 ENCOUNTER — TELEPHONE (OUTPATIENT)
Dept: ORTHOPEDICS CLINIC | Facility: CLINIC | Age: 69
End: 2024-09-23

## 2024-09-23 VITALS — HEIGHT: 71 IN | WEIGHT: 185 LBS | BODY MASS INDEX: 25.9 KG/M2

## 2024-09-23 DIAGNOSIS — G56.02 CARPAL TUNNEL SYNDROME OF LEFT WRIST: ICD-10-CM

## 2024-09-23 DIAGNOSIS — G56.01 CARPAL TUNNEL SYNDROME OF RIGHT WRIST: ICD-10-CM

## 2024-09-23 DIAGNOSIS — M65.331 TRIGGER MIDDLE FINGER OF RIGHT HAND: ICD-10-CM

## 2024-09-23 DIAGNOSIS — G56.02 CARPAL TUNNEL SYNDROME ON LEFT: ICD-10-CM

## 2024-09-23 DIAGNOSIS — G56.01 CARPAL TUNNEL SYNDROME OF RIGHT WRIST: Primary | ICD-10-CM

## 2024-09-23 DIAGNOSIS — M65.331 TRIGGER MIDDLE FINGER OF RIGHT HAND: Primary | ICD-10-CM

## 2024-09-23 NOTE — PROGRESS NOTES
SURGICAL BOOKING SHEET   Name: Andrea Poole  MRN: [id number]   : 1955    Surgical Date:   24   Surgical Consent:   Left carpal tunnel release   Diagnosis:      ICD-10-CM   1. Carpal tunnel syndrome of right wrist  G56.01   2. Trigger middle finger of right hand  M65.331   3. Carpal tunnel syndrome of left wrist  G56.02       Workers Comp: No   Procedure Codes:   Open carpal tunnel release (CPT 64215)   Disposition:   Outpatient   Operative Time:   15 mins   Antibiotics:   None   Anesthesia Type:   Local Only   Clearance:    None   Equipment:   TFR  OCTR (Local/MAC): [place] Blade, Lead Hand, 4-0 Nylon Suture PS-2, Local Pre-Mix (1% lidocaine w/ epi, 0.5% marcaine, and 8.4% NaBicarb), Bacitracin, Adaptic, 4x4 gauze, 2 inch ace wrap   Assistant:   Nba Assistant: Yes, Soledad Valencia PA-C   Follow Up:   10-14 days post op with Soledad   Pain Medication:   TBD   Therapy:   None

## 2024-09-23 NOTE — TELEPHONE ENCOUNTER
Date of Surgery: 2024      Post Op Appt: 2024 1PM    Case ID: [id number]    Notes:     SURGICAL BOOKING SHEET   Name: Andrea Poole  MRN: [id number]   : 1955     Surgical Date:    24   Surgical Consent:    Left carpal tunnel release   Diagnosis:         ICD-10-CM   1. Carpal tunnel syndrome of right wrist  G56.01   2. Trigger middle finger of right hand  M65.331   3. Carpal tunnel syndrome of left wrist  G56.02       Workers Comp: No   Procedure Codes:    Open carpal tunnel release (CPT 38664)   Disposition:    Outpatient   Operative Time:    15 mins   Antibiotics:    None   Anesthesia Type:    Local Only   Clearance:     None   Equipment:    TFR  OCTR (Local/MAC): [place] Blade, Lead Hand, 4-0 Nylon Suture PS-2, Local Pre-Mix (1% lidocaine w/ epi, 0.5% marcaine, and 8.4% NaBicarb), Bacitracin, Adaptic, 4x4 gauze, 2 inch ace wrap   Assistant:    Nba Assistant: Yes, Soledad Valencia PA-C   Follow Up:    10-14 days post op with Soledad   Pain Medication:    TBD   Therapy:    None

## 2024-09-23 NOTE — PROGRESS NOTES
Clinic Note [facility]     Assessment/Plan:  69 year old male    Right carpal tunnel release and middle finger A1 pulley release 6/11/2024-doing well.  Slightly still stiff to PIP joint extension and we went through exercises today.  And hopeful that will continue to improve.  All of his numbness and tingling is resolved.  Left carpal tunnel syndrome-EMG confirmed-we discussed moving forward with endoscopic but tunnel release possible open.  We discussed pros and cons and risks and benefits as well as recovery time and expected outcomes and he elected to proceed.  Will get him on the schedule for November 5.  Office questions were answered.  CTR Consent: Non-surgical and surgical treatment options where reviewed with the patient. Patient elected to proceed with surgical decompression of median nerve in the carpal tunnel. Patient consented to surgery after having understood the risks associated with surgery, expected outcomes, time to recovery and the possible need for therapy post-operatively. Risks include but not limited to: infection, wound complication, nerve injury resulting in temporary or permanent nerve damage, finger/hand stiffness, persistent pain/symptoms, swelling, CRPS, recurrence of carpal tunnel syndrome, and need for additional surgery.    Left middle finger trigger finger-patient states that this does not bother him and he would like to hold off on surgery.  I offered cortisone injection he also would like to hold off.  If he changes his mind he will contact us.  Follow Up: 6 weeks but can cancel if better    Diagnostic Studies:  EMG/NCV: Deferred    Physical Exam:    Ht 5' 11\" (1.803 m)   Wt 185 lb (83.9 kg)   BMI 25.80 kg/m²     Constitutional: NAD. AOx3. Well-developed and Well-nourished.   Psychiatric: Normal mood/ affect/ behavior. Judgment and thought content normal.     Right upper Extremity:   Inspection: Skin Intact. No skin lesions. No gross deformity.   Palpation: Tenderness  palpation of the A1 pulley at left middle finger   Motion: Elbow: normal bilateral symmetric ext/flex  Wrist: normal bilateral symmetric ext/flex/sup/pro  Finger: Triggering of the right middle finger is noted.  He has a flexion contracture of 30 degrees of passively correctable   Vascular 2+ radial pulse     Median Nerve Exam Right   Phdurkan neg   Sensation normal   PCBr Sensation normal   APB Weakness No   Thenar Atrophy No     Ulnar Nerve Exam    Sensation normal   1st JEOVANNY Weakness No   Hypothenar Atrophy No     Radial Nerve Exam  Right Left   Radial Sensory normal normal     CC: Right carpal tunnel syndrome/trigger finger    HPI: This 69 year old right-hand-dominant male presents with numbness and tingling in the right hand.  He also ports intermittent locking.  Pain intensity is moderate.  Patient has tried finger brace and wrist brace.  Patient reports pain at nighttime and difficulty sleeping secondary to the pain.  It started September 2023.    Occupation: Retired    Interval History: Patient underwent carpal tunnel release and trigger finger release.  His postop pain is improving.  He is continue to have numbness and tingling at night on the left side as well as triggering at the middle finger.    Past Medical History:    Allergic reaction to bee sting    Cancer (HCC)    prostate cancer - prostatecotomy    Colon polyps    Lumbar disc disease with radiculopathy    Personal history of malignant neoplasm of prostate    T wave inversion in EKG    Cardiology work-up 9 [facility] cardiologist 7/16/2012 had normal CT angiogram    Unspecified essential hypertension    Vitamin D deficiency     Past Surgical History:   Procedure Laterality Date    Back surgery  1992    Laminectomy Lumbar L4 L5    Back surgery  4/1/13    L4-S1 revision decomp with TLIF with Dr Herrera at [place]    Colonoscopy  2016    Colonoscopy N/A 2/28/2022    Procedure: COLONOSCOPY,;  Surgeon: Jovanny Tapia MD;  [facility]    Colonoscopy,biopsy N/A 2016    Procedure: COLONOSCOPY, POSSIBLE BIOPSY, POSSIBLE POLYPECTOMY 35126;  Surgeon: Jovanny Tapia MD;  Location: [facility]    Other surgical history      knee arthroscopy    Other surgical history      Ruptured Achilles Tendon    Other surgical history      Prostate Surgery    Tonsillectomy      Vasectomy       Current Outpatient Medications   Medication Sig Dispense Refill    ketoconazole 2 % External Cream Apply 1 Application  topically daily. 30 g 0    rosuvastatin 5 MG Oral Tab Take 1 tablet (5 mg total) by mouth nightly. 90 tablet 3     No Known Allergies  Family History   Problem Relation Age of Onset    Other (Parkinson's Disease[other]) Mother     Cancer Father         Colon/Prostate    Other (Lupus Erythematosus[other]) Sister         Systemic     Social History     Occupational History    Not on file   Tobacco Use    Smoking status: Former     Current packs/day: 0.00     Types: Cigarettes     Start date: 1971     Quit date: 1984     Years since quittin.7    Smokeless tobacco: Never   Vaping Use    Vaping status: Never Used   Substance and Sexual Activity    Alcohol use: Yes     Alcohol/week: 10.0 standard drinks of alcohol     Types: 4 Glasses of wine, 6 Cans of beer per week    Drug use: Never    Sexual activity: Not on file      Review of Systems (negative unless bolded):  General: fevers, chills, fatigue  CV:  chest pain, palpitations, leg swelling  Msk: bodyaches, neck pain, neck stiffness  Skin: rashes, open wounds, nonhealing ulcers  Hem: bleeds easily, bruise easily, immunocompromised  Neuro: dizziness, light headedness, headaches  Psych: anxious, depressed, anger issues  Soledad Archuleta PA-C  Hand, Wrist, & Elbow Surgery  Physician Assistant to Dr. Luis Antonio [email or web address]  t: [phone]  f: [phone]

## 2024-09-24 NOTE — TELEPHONE ENCOUNTER
S/P went over surgical protocol and scheduled Post Op Appointment. Patient had no other questions or concerns. I did advise the Patient if they had any additional questions to give us a call back for further assistance.

## 2024-09-24 NOTE — TELEPHONE ENCOUNTER
Left message for patient to call back to go over surgical protocol and scheduled Post Op Appointment.

## 2024-11-08 ENCOUNTER — TELEPHONE (OUTPATIENT)
Dept: ORTHOPEDICS CLINIC | Facility: CLINIC | Age: 69
End: 2024-11-08

## 2024-11-08 NOTE — TELEPHONE ENCOUNTER
Patient called stating the dressing peeled up off the incision and the dressing is damp.      He was instructed to change the dressing as the incision should not be left wet.  He was educated on hand hygiene, removing the dressing, allow it to air dry or pat gently with clean new gauze.  Then when dry re apply a waterproof bandage.  He verbalized understanding.

## 2024-11-18 ENCOUNTER — OFFICE VISIT (OUTPATIENT)
Dept: ORTHOPEDICS CLINIC | Facility: CLINIC | Age: 69
End: 2024-11-18
Payer: MEDICARE

## 2024-11-18 VITALS — WEIGHT: 185 LBS | BODY MASS INDEX: 25.9 KG/M2 | HEIGHT: 71 IN

## 2024-11-18 DIAGNOSIS — G56.02 CARPAL TUNNEL SYNDROME ON LEFT: Primary | ICD-10-CM

## 2024-11-18 PROCEDURE — 99024 POSTOP FOLLOW-UP VISIT: CPT | Performed by: PHYSICIAN ASSISTANT

## 2024-11-18 PROCEDURE — 1159F MED LIST DOCD IN RCRD: CPT | Performed by: PHYSICIAN ASSISTANT

## 2024-11-18 PROCEDURE — 1160F RVW MEDS BY RX/DR IN RCRD: CPT | Performed by: PHYSICIAN ASSISTANT

## 2024-11-18 PROCEDURE — 1126F AMNT PAIN NOTED NONE PRSNT: CPT | Performed by: PHYSICIAN ASSISTANT

## 2024-11-18 NOTE — PROGRESS NOTES
Clinic Note [facility]     Assessment/Plan:  69 year old male    Right carpal tunnel release and middle finger A1 pulley release 6/11/2024-doing well.  Slightly still stiff to PIP joint extension and we went through exercises today.  And hopeful that will continue to improve.  All of his numbness and tingling is resolved.  Left endoscopic carpal tunnel release 11/5/2024- numbness has completely resolved.  He can progress to activities as tolerated.  Mild pillar pain will improve with time.  All of his questions were answered.  Left middle finger trigger finger-patient states that this does not bother him and he would like to hold off on surgery.  I offered cortisone injection he also would like to hold off.  If he changes his mind he will contact us.    Follow Up: As needed  Diagnostic Studies:  EMG/NCV: Deferred    Physical Exam:    Ht 5' 11\" (1.803 m)   Wt 185 lb (83.9 kg)   BMI 25.80 kg/m²     Constitutional: NAD. AOx3. Well-developed and Well-nourished.   Psychiatric: Normal mood/ affect/ behavior. Judgment and thought content normal.     Right upper Extremity:   Inspection: Incision healed nicely with no signs of infection   Palpation: Tenderness at the incision but mild   Motion: Elbow: normal bilateral symmetric ext/flex  Wrist: normal bilateral symmetric ext/flex/sup/pro  Finger: Full composite fist   Vascular 2+ radial pulse     Median Nerve Exam Right   Phdurkan neg   Sensation normal   PCBr Sensation normal   APB Weakness No   Thenar Atrophy No     Ulnar Nerve Exam    Sensation normal   1st JEOVANNY Weakness No   Hypothenar Atrophy No     Radial Nerve Exam  Right Left   Radial Sensory normal normal     CC: Right carpal tunnel syndrome/trigger finger    HPI: This 69 year old right-hand-dominant male presents with numbness and tingling in the right hand.  He also ports intermittent locking.  Pain intensity is moderate.  Patient has tried finger brace and wrist brace.  Patient reports pain at nighttime  and difficulty sleeping secondary to the pain.  It started September 2023.    Occupation: Retired    Interval History: Patient underwent carpal tunnel release and his numbness and tingling is completely resolved.  He is very happy with his recovery thus far.    Past Medical History:    Allergic reaction to bee sting    Cancer (HCC)    prostate cancer - prostatecotomy    Colon polyps    High cholesterol    Lumbar disc disease with radiculopathy    Personal history of malignant neoplasm of prostate    T wave inversion in EKG    Cardiology work-up 9 [facility] cardiologist 7/16/2012 had normal CT angiogram    Unspecified essential hypertension    Vitamin D deficiency     Past Surgical History:   Procedure Laterality Date    Back surgery  1992    Laminectomy Lumbar L4 L5    Back surgery  4/1/13    L4-S1 revision decomp with TLIF with Dr Herrera at [place]    Colonoscopy  2016    Colonoscopy N/A 2/28/2022    Procedure: COLONOSCOPY,;  Surgeon: Jovanny Tapia MD;  Location: [facility]    Colonoscopy,biopsy N/A 7/13/2016    Procedure: COLONOSCOPY, POSSIBLE BIOPSY, POSSIBLE POLYPECTOMY 09221;  Surgeon: Jovanny Tapia MD;  Location: [facility]    Other surgical history      knee arthroscopy    Other surgical history  1990    Ruptured Achilles Tendon    Other surgical history      Prostate Surgery    Tonsillectomy      Vasectomy  1990     Current Outpatient Medications   Medication Sig Dispense Refill    ketoconazole 2 % External Cream Apply 1 Application  topically daily. 30 g 0    rosuvastatin 5 MG Oral Tab Take 1 tablet (5 mg total) by mouth nightly. 90 tablet 3     No Known Allergies  Family History   Problem Relation Age of Onset    Other (Parkinson's Disease[other]) Mother     Cancer Father         Colon/Prostate    Other (Lupus Erythematosus[other]) Sister         Systemic     Social History     Occupational History    Not on file   Tobacco Use    Smoking status: Former     Current packs/day:  0.00     Types: Cigarettes     Start date: 1971     Quit date: 1984     Years since quittin.9    Smokeless tobacco: Never   Vaping Use    Vaping status: Never Used   Substance and Sexual Activity    Alcohol use: Yes     Alcohol/week: 10.0 standard drinks of alcohol     Types: 4 Glasses of wine, 6 Cans of beer per week    Drug use: Never    Sexual activity: Not on file      Review of Systems (negative unless bolded):  General: fevers, chills, fatigue  CV:  chest pain, palpitations, leg swelling  Msk: bodyaches, neck pain, neck stiffness  Skin: rashes, open wounds, nonhealing ulcers  Hem: bleeds easily, bruise easily, immunocompromised  Neuro: dizziness, light headedness, headaches  Psych: anxious, depressed, anger issues  Soledad Archuleta PA-C  Hand, Wrist, & Elbow Surgery  Physician Assistant to Dr. Luis Antonio [email or web address]  t: [phone]  f: [phone]

## 2024-11-21 ENCOUNTER — OFFICE VISIT (OUTPATIENT)
Dept: FAMILY MEDICINE CLINIC | Facility: CLINIC | Age: 69
End: 2024-11-21
Payer: MEDICARE

## 2024-11-21 VITALS
HEART RATE: 59 BPM | BODY MASS INDEX: 27.77 KG/M2 | OXYGEN SATURATION: 98 % | RESPIRATION RATE: 16 BRPM | TEMPERATURE: 97 F | WEIGHT: 194 LBS | HEIGHT: 70 IN | DIASTOLIC BLOOD PRESSURE: 64 MMHG | SYSTOLIC BLOOD PRESSURE: 110 MMHG

## 2024-11-21 DIAGNOSIS — Z13.29 SCREENING FOR THYROID DISORDER: ICD-10-CM

## 2024-11-21 DIAGNOSIS — Z97.4 DOES USE HEARING AID: ICD-10-CM

## 2024-11-21 DIAGNOSIS — Z98.890 HISTORY OF BACK SURGERY: ICD-10-CM

## 2024-11-21 DIAGNOSIS — Z13.0 SCREENING FOR DEFICIENCY ANEMIA: ICD-10-CM

## 2024-11-21 DIAGNOSIS — E78.2 MIXED HYPERLIPIDEMIA: ICD-10-CM

## 2024-11-21 DIAGNOSIS — Z00.00 ENCOUNTER FOR ANNUAL HEALTH EXAMINATION: Primary | ICD-10-CM

## 2024-11-21 DIAGNOSIS — Z13.6 SCREENING FOR CARDIOVASCULAR CONDITION: ICD-10-CM

## 2024-11-21 DIAGNOSIS — D22.9 MULTIPLE PIGMENTED NEVI: ICD-10-CM

## 2024-11-21 DIAGNOSIS — R73.9 HYPERGLYCEMIA: ICD-10-CM

## 2024-11-21 DIAGNOSIS — Z85.46 HISTORY OF PROSTATE CANCER: ICD-10-CM

## 2024-11-21 PROCEDURE — 1159F MED LIST DOCD IN RCRD: CPT | Performed by: FAMILY MEDICINE

## 2024-11-21 PROCEDURE — G0008 ADMIN INFLUENZA VIRUS VAC: HCPCS | Performed by: FAMILY MEDICINE

## 2024-11-21 PROCEDURE — 90662 IIV NO PRSV INCREASED AG IM: CPT | Performed by: FAMILY MEDICINE

## 2024-11-21 PROCEDURE — 96160 PT-FOCUSED HLTH RISK ASSMT: CPT | Performed by: FAMILY MEDICINE

## 2024-11-21 PROCEDURE — G0439 PPPS, SUBSEQ VISIT: HCPCS | Performed by: FAMILY MEDICINE

## 2024-11-21 PROCEDURE — 1170F FXNL STATUS ASSESSED: CPT | Performed by: FAMILY MEDICINE

## 2024-11-21 PROCEDURE — 1126F AMNT PAIN NOTED NONE PRSNT: CPT | Performed by: FAMILY MEDICINE

## 2024-11-21 NOTE — PROGRESS NOTES
Subjective:   Andrea Poole is a 69 year old male who presents for a MA AHA (Medicare Advantage Annual Health Assessment) and Subsequent Annual Wellness visit (Pt already had Initial Annual Wellness) and scheduled follow up of multiple significant but stable problems.     RSV and COVID will be getting done at pharmacy    This past year had carpal tunnel release bilateral and trigger finger release on the right    Specialists  Dermatology Dr. Perez  Optometrist Dr. Allen every 2 years no issues  Gastroenterologist Dr. Tapia        Medicare health assessment  No advanced directives in the chart has them completed at his home will forward   Dental and eye exams up-to-date  Vascular screening 3/22/2018 carotid  arteries normal.  Ultrafast CT of the heart completed 8/2/2023 left main 15.7 with a total score of 15.7  Hearing issues using hearing aids   Immunizations Shingrix vaccine completed ; COVID/RSV due at pharmacy for fall 2024; Tdap 12/3/2019; Pneumonia vaccine completed; Flu vaccine today  Sleep is good, snoring is rare and no apnea witnessed.  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  Diet is healthy avoids processed foods.  Grandchildren total at 9.  Walking fast 3 miles per day 5 times a week stopped running also yoga and resistance therapy.  History of smoking quit 35 years ago history of smoking quit 34 years ago    History of smoking  Quit smoking at 35 years old smoked between 17 years old and 35 years old at 1 pack/day for a 17-year  pack history quit 34 years ago and denies any cough or COPD symptoms     Family history No family history of CAD, aneurysms no stents, bypasses or strokes.  Dad prostate cancer no other cancers     Mixed hyperlipidemia  On Crestor 5 mg tolerates well no side effects denies myalgias  Due for lipids and CMP this year  3/22/18  Your results were: 1. Right ICA = 1  Left ICA = 1   Ultrafast CT of the heart completed 8/2/2023 left main 15.7 with a total score of 15.7  Nuclear stress test IMPRESSION:05/17/2012    1. Normal SPECT myocardial perfusion imaging.  2. Abnormal EKG response to exercise, which likely represents a false-positive stress EKG.  3. Normal left ventricular function, ejection fraction 63%.  4. Above average exercise capacity, achieving 13 METS.  5. No significant arrhythmias.    Hyperglycemia  Hemoglobin A1c has been elevated up to 6.0 in the past watches a good diet low-carb and exercises regularly.  Hemoglobin A1c 2023 6.0  Due for hemoglobin A1c  Multiple pigmented nevi  Continue with follow-ups with dermatology.  No skin cancer     History of back surgery  Patient is an active walks on a regular basis did give up running because of his back is doing well with his back now has not had any .     History of prostate cancer  Patient had prostatectomy surgery in 2010.  Has not been having any ED issues and urination is normal.  PSA has been less than 0.01  Nocturia 0-1  12/6/2024 PSA less than 0.01  States no urinary changes has still good stream    No ED symptoms        History/Other:   Fall Risk Assessment:   He has been screened for Falls and is low risk.      Cognitive Assessment:   He had a completely normal cognitive assessment - see flowsheet entries     Functional Ability/Status:   Andrea Poole has some abnormal functions as listed below:  He has Hearing problems based on screening of functional status.      Depression Screening (PHQ):  PHQ-2 SCORE: 0  , done 11/21/2024   If you checked off  any problems, how difficult have these problems made it for you to do your work, take care of things at home, or get along with other people?: Not difficult at all    Last [place] Suicide Screening on 11/21/2024 was No Risk.         Advanced Directives:   He does have a Living Will but we do NOT have it on file in Epic.    He does have a POA but we do NOT have it on file in [employer].    Patient has Advance Care Planning documents but we do not have a copy in EMR. Discussed Advanced Care Planning with patient and instructed patient to get our office a copy to be scanned into EMR.      Patient Active Problem List   Diagnosis    History of prostate cancer    History of back surgery    Multiple pigmented nevi    Hyperglycemia    History of tinea pedis    Mixed hyperlipidemia    Does use hearing aid     Allergies:  He has no allergies on file.    Current Medications:  Outpatient Medications Marked as Taking for the 11/21/24 encounter (Office Visit) with Trista Russo PA-C   Medication Sig    ketoconazole 2 % External Cream Apply 1 Application  topically daily.    rosuvastatin 5 MG Oral Tab Take 1 tablet (5 mg total) by mouth nightly.       Medical History:  He  has a past medical history of Allergic reaction to bee sting, Cancer (HCC) (2010), Colon polyps, High cholesterol, Lumbar disc disease with radiculopathy, Personal history of malignant neoplasm of prostate (01/26/2012), T wave inversion in EKG (10/17/2020), Unspecified essential hypertension, and Vitamin D deficiency.  Surgical History:  He  has a past surgical history that includes other surgical history; other surgical history (1990); other surgical history; tonsillectomy; back surgery (1992); back surgery (04/01/2013); colonoscopy,biopsy (N/A, 07/13/2016); colonoscopy (2016); vasectomy (1990); colonoscopy (N/A, 02/28/2022); and carpal tunnel release (Left, 11/05/2024).   Family History:  His family history includes Cancer in his father; Lupus Erythematosus in  his sister; Parkinson's Disease in his mother.  Social History:  He  reports that he quit smoking about 40 years ago. His smoking use included cigarettes. He started smoking about 53 years ago. He has never been exposed to tobacco smoke. He has never used smokeless tobacco. He reports current alcohol use of about 10.0 standard drinks of alcohol per week. He reports that he does not use drugs.    Tobacco:  He smoked tobacco in the past but quit greater than 12 months ago.  Social History     Tobacco Use   Smoking Status Former    Current packs/day: 0.00    Types: Cigarettes    Start date: 1971    Quit date: 1984    Years since quittin.9    Passive exposure: Never   Smokeless Tobacco Never          CAGE Alcohol Screen:   CAGE screening score of 0 on 2024, showing low risk of alcohol abuse.      Patient Care Team:  Laura Rojas DO as PCP - General (Family Practice)  Trista Russo PA-C as Physician Assistant (Family Practice)  Rishi Bello OT as Occupational Therapist (Occupational Therapist)    Review of Systems  GENERAL: feels well otherwise  SKIN: denies any unusual skin lesions  EYES: denies blurred vision or double vision  HEENT: denies nasal congestion, sinus pain or ST  LUNGS: denies shortness of breath with exertion  CARDIOVASCULAR: denies chest pain on exertion  GI: denies abdominal pain, denies heartburn  : 1 per night nocturia, no complaint of urinary incontinence history of prostatectomy from prostate cancer no sequela  MUSCULOSKELETAL: denies back pain  NEURO: denies headaches  PSYCHE: denies depression or anxiety  HEMATOLOGIC: denies hx of anemia  ENDOCRINE: denies thyroid history  ALL/ASTHMA: denies hx of allergy or asthma    Objective:   Physical Exam  General Appearance:  Alert, cooperative, no distress, appears stated age   Head:  Normocephalic, without obvious abnormality, atraumatic   Eyes:  PERRL, conjunctiva/corneas clear, EOM's intact, both eyes   Ears:  Normal TM's  and external ear canals, both ears   Nose: Nares normal, septum midline, mucosa normal, no drainage or sinus tenderness   Throat: Lips, mucosa, and tongue normal; teeth and gums normal   Neck: Supple, symmetrical, trachea midline, no adenopathy, thyroid: not enlarged, symmetric, no tenderness/mass/nodules, no carotid bruit or JVD   Back:   Symmetric, no curvature, ROM normal, no CVA tenderness   Lungs:   Clear to auscultation bilaterally, respirations unlabored   Chest Wall:  No tenderness or deformity   Heart:  Regular rate and rhythm, S1, S2 normal, no murmur, rub or gallop   Abdomen:   Soft, non-tender, bowel sounds active all four quadrants,  no masses, no organomegaly   Genitalia: No concerns no swelling no pain deferred exam   Rectal: Deferred exam   Extremities: Extremities normal, atraumatic, no cyanosis or edema   Pulses: 2+ and symmetric   Skin: Skin color, texture, turgor normal, no rashes or lesions   Lymph nodes: Cervical, supraclavicular, and axillary nodes normal   Neurologic: Normal     /64 (BP Location: Left arm, Patient Position: Sitting, Cuff Size: adult)   Pulse 59   Temp 97.4 °F (36.3 °C) (Temporal)   Resp 16   Ht 5' 10\" (1.778 m)   Wt 194 lb (88 kg)   SpO2 98%   BMI 27.84 kg/m²  Estimated body mass index is 27.84 kg/m² as calculated from the following:    Height as of this encounter: 5' 10\" (1.778 m).    Weight as of this encounter: 194 lb (88 kg).    Medicare Hearing Assessment:   Hearing Screening    Screening Method: Finger Rub  Finger Rub Result: Fail (Comment: Diminished not wearing hearing aids)               Assessment & Plan:   Andrea Poole is a 69 year old male who presents for a Medicare Assessment.     1. Encounter for annual health examination (Primary)  Has completed  DNR POLST form, power of  form and living will at home will forward to office.  Dental exams every 6 months  Eye exams yearly or as directed by eye specialist  Dermatological exams  yearly  Follow-up with specialists as directed  Healthy diet avoiding processed foods focus on high-fiber with fruits, vegetables and lean protein.  Exercise regularly as tolerated both aerobic and weightbearing.  Sleep goal 8 hours per night practice good sleep hygiene.  Schedule for further testing as discussed during office visit.  Stay updated on vaccinations including if needed  RSV, influenza, pneumonia vaccine, COVID-19 vaccine, shingles vaccine and tetanus vaccine (every 10 years) at pharmacy.    2. Mixed hyperlipidemia  -     Comp Metabolic Panel (14); Future; Expected date: 11/21/2024  Continue with rosuvastatin 5 mg well-tolerated no side effects  3. Hyperglycemia  -     Hemoglobin A1C; Future; Expected date: 11/21/2024 continue with excellent diet, low processed food and low carb diet  4. History of prostate cancer  -     PSA Total, Diagnostic; Future; Expected date: 11/21/2024  5. Screening for thyroid disorder  -     TSH W Reflex To Free T4; Future; Expected date: 11/21/2024  6. Screening for deficiency anemia  -     CBC With Differential With Platelet; Future; Expected date: 11/21/2024  7. Screening for cardiovascular condition  -     Lipid Panel; Future; Expected date: 11/21/2024  8. History of back surgery  Asymptomatic  9. Does use hearing aid  10. Multiple pigmented nevi  Continue with dermatologist  Other orders  -     High Dose Fluzone trivalent influenza, 65 yrs+ PFS [[id number]]  The patient indicates understanding of these issues and agrees to the plan.  Continue with current treatment plan.  Lab work ordered.  Reinforced healthy diet, lifestyle, and exercise.      Return in 1 year (on 11/21/2025).     Trista Russo PA-C, 11/21/2024     Supplementary Documentation:   General Health:  In the past six months, have you lost more than 10 pounds without trying?: 2 - No  Has your appetite been poor?: No  Type of Diet: Balanced  How does the patient maintain a good energy level?: Appropriate  Exercise;Daily Walks;Stretching  How would you describe your daily physical activity?: Moderate  How would you describe your current health state?: Good  How do you maintain positive mental well-being?: Social Interaction;Visiting Family;Puzzles;Games  On a scale of 0 to 10, with 0 being no pain and 10 being severe pain, what is your pain level?: 0 - (None)  In the past six months, have you experienced urine leakage?: 0-No  At any time do you feel concerned for the safety/well-being of yourself and/or your children, in your home or elsewhere?: No  Have you had any immunizations at another office such as Influenza, Hepatitis B, Tetanus, or Pneumococcal?: No    Health Maintenance   Topic Date Due    MA Annual Health Assessment  01/01/2024    COVID-19 Vaccine (6 - 2024-25 season) 09/01/2024    Influenza Vaccine (1) 10/01/2024    PSA  12/06/2024    Colorectal Cancer Screening  02/28/2029    Annual Depression Screening  Completed    Fall Risk Screening (Annual)  Completed    Pneumococcal Vaccine: 65+ Years  Completed    Zoster Vaccines  Completed

## 2024-11-22 PROBLEM — R20.0 NUMBNESS AND TINGLING IN BOTH HANDS: Status: RESOLVED | Noted: 2022-11-08 | Resolved: 2024-11-22

## 2024-11-22 PROBLEM — G56.01 RIGHT CARPAL TUNNEL SYNDROME: Status: RESOLVED | Noted: 2023-11-11 | Resolved: 2024-11-22

## 2024-11-22 PROBLEM — R20.2 NUMBNESS AND TINGLING IN BOTH HANDS: Status: RESOLVED | Noted: 2022-11-08 | Resolved: 2024-11-22

## 2024-12-03 ENCOUNTER — MED REC SCAN ONLY (OUTPATIENT)
Dept: FAMILY MEDICINE CLINIC | Facility: CLINIC | Age: 69
End: 2024-12-03

## 2024-12-12 ENCOUNTER — LAB ENCOUNTER (OUTPATIENT)
Dept: LAB | Age: 69
End: 2024-12-12
Attending: FAMILY MEDICINE
Payer: MEDICARE

## 2024-12-12 DIAGNOSIS — Z85.46 HISTORY OF PROSTATE CANCER: ICD-10-CM

## 2024-12-12 DIAGNOSIS — Z13.29 SCREENING FOR THYROID DISORDER: ICD-10-CM

## 2024-12-12 DIAGNOSIS — Z13.0 SCREENING FOR DEFICIENCY ANEMIA: ICD-10-CM

## 2024-12-12 DIAGNOSIS — Z13.6 SCREENING FOR CARDIOVASCULAR CONDITION: ICD-10-CM

## 2024-12-12 DIAGNOSIS — R73.9 HYPERGLYCEMIA: ICD-10-CM

## 2024-12-12 DIAGNOSIS — E78.2 MIXED HYPERLIPIDEMIA: ICD-10-CM

## 2024-12-12 LAB
ALBUMIN SERPL-MCNC: 4.2 G/DL (ref 3.2–4.8)
ALBUMIN/GLOB SERPL: 1.5 {RATIO} (ref 1–2)
ALP LIVER SERPL-CCNC: 56 U/L
ALT SERPL-CCNC: 27 U/L
ANION GAP SERPL CALC-SCNC: 5 MMOL/L (ref 0–18)
AST SERPL-CCNC: 27 U/L (ref ?–34)
BASOPHILS # BLD AUTO: 0.08 X10(3) UL (ref 0–0.2)
BASOPHILS NFR BLD AUTO: 1.6 %
BILIRUB SERPL-MCNC: 2.2 MG/DL (ref 0.2–1.1)
BUN BLD-MCNC: 12 MG/DL (ref 9–23)
CALCIUM BLD-MCNC: 9.6 MG/DL (ref 8.7–10.4)
CHLORIDE SERPL-SCNC: 107 MMOL/L (ref 98–112)
CHOLEST SERPL-MCNC: 162 MG/DL (ref ?–200)
CO2 SERPL-SCNC: 28 MMOL/L (ref 21–32)
CREAT BLD-MCNC: 0.95 MG/DL
EGFRCR SERPLBLD CKD-EPI 2021: 87 ML/MIN/1.73M2 (ref 60–?)
EOSINOPHIL # BLD AUTO: 0.26 X10(3) UL (ref 0–0.7)
EOSINOPHIL NFR BLD AUTO: 5 %
ERYTHROCYTE [DISTWIDTH] IN BLOOD BY AUTOMATED COUNT: 13.7 %
EST. AVERAGE GLUCOSE BLD GHB EST-MCNC: 126 MG/DL (ref 68–126)
FASTING PATIENT LIPID ANSWER: YES
FASTING STATUS PATIENT QL REPORTED: YES
GLOBULIN PLAS-MCNC: 2.8 G/DL (ref 2–3.5)
GLUCOSE BLD-MCNC: 95 MG/DL (ref 70–99)
HBA1C MFR BLD: 6 % (ref ?–5.7)
HCT VFR BLD AUTO: 44.7 %
HDLC SERPL-MCNC: 93 MG/DL (ref 40–59)
HGB BLD-MCNC: 15.3 G/DL
IMM GRANULOCYTES # BLD AUTO: 0.01 X10(3) UL (ref 0–1)
IMM GRANULOCYTES NFR BLD: 0.2 %
LDLC SERPL CALC-MCNC: 56 MG/DL (ref ?–100)
LYMPHOCYTES # BLD AUTO: 2.31 X10(3) UL (ref 1–4)
LYMPHOCYTES NFR BLD AUTO: 44.8 %
MCH RBC QN AUTO: 30.5 PG (ref 26–34)
MCHC RBC AUTO-ENTMCNC: 34.2 G/DL (ref 31–37)
MCV RBC AUTO: 89.2 FL
MONOCYTES # BLD AUTO: 0.48 X10(3) UL (ref 0.1–1)
MONOCYTES NFR BLD AUTO: 9.3 %
NEUTROPHILS # BLD AUTO: 2.02 X10 (3) UL (ref 1.5–7.7)
NEUTROPHILS # BLD AUTO: 2.02 X10(3) UL (ref 1.5–7.7)
NEUTROPHILS NFR BLD AUTO: 39.1 %
NONHDLC SERPL-MCNC: 69 MG/DL (ref ?–130)
OSMOLALITY SERPL CALC.SUM OF ELEC: 290 MOSM/KG (ref 275–295)
PLATELET # BLD AUTO: 213 10(3)UL (ref 150–450)
POTASSIUM SERPL-SCNC: 4.2 MMOL/L (ref 3.5–5.1)
PROT SERPL-MCNC: 7 G/DL (ref 5.7–8.2)
PSA SERPL-MCNC: <0.04 NG/ML (ref ?–4)
RBC # BLD AUTO: 5.01 X10(6)UL
SODIUM SERPL-SCNC: 140 MMOL/L (ref 136–145)
TRIGL SERPL-MCNC: 70 MG/DL (ref 30–149)
TSI SER-ACNC: 2.24 UIU/ML (ref 0.55–4.78)
VLDLC SERPL CALC-MCNC: 10 MG/DL (ref 0–30)
WBC # BLD AUTO: 5.2 X10(3) UL (ref 4–11)

## 2024-12-12 PROCEDURE — 36415 COLL VENOUS BLD VENIPUNCTURE: CPT

## 2024-12-12 PROCEDURE — 85025 COMPLETE CBC W/AUTO DIFF WBC: CPT

## 2024-12-12 PROCEDURE — 80053 COMPREHEN METABOLIC PANEL: CPT

## 2024-12-12 PROCEDURE — 84153 ASSAY OF PSA TOTAL: CPT

## 2024-12-12 PROCEDURE — 80061 LIPID PANEL: CPT

## 2024-12-12 PROCEDURE — 83036 HEMOGLOBIN GLYCOSYLATED A1C: CPT

## 2024-12-12 PROCEDURE — 84443 ASSAY THYROID STIM HORMONE: CPT

## 2024-12-13 NOTE — PROGRESS NOTES
Stable hemoglobin A1c prediabetes, continue to focus on healthy diet and exercise daily as tolerated.  Continue healthy diet with reduced simple and complex carbohydrates including the white bread, rice and pasta. Increase vegetables, fruits, healthy low saturated fat proteins and substitute complex carbohydrates with higher fiber/grained carbohydrates  (examples:  steel cut oats, lentils, quinoa, rye, wild rice).     Repeat the hemoglobin A1c in 12 months.  .  Normal white and red blood cell counts.  Normal kidney and liver function testing besides bilirubin mildly elevated consistent with Gilbert's syndrome that we have discussed in the past.  Normal fasting blood sugar testing.  Normal lipid testing   Normal thyroid testing  Normal prostate testing  Sincerely,   Trista Russo PA-C

## 2025-02-03 DIAGNOSIS — E78.00 ELEVATED LDL CHOLESTEROL LEVEL: ICD-10-CM

## 2025-02-03 RX ORDER — ROSUVASTATIN CALCIUM 5 MG/1
5 TABLET, COATED ORAL NIGHTLY
Qty: 90 TABLET | Refills: 3 | Status: SHIPPED | OUTPATIENT
Start: 2025-02-03

## (undated) DIAGNOSIS — E78.00 ELEVATED LDL CHOLESTEROL LEVEL: ICD-10-CM

## (undated) NOTE — MR AVS SNAPSHOT
[facility]  [phone]               Thank you for choosing us for your health care visit with Shira Rushing PA-C.   We are glad to serve you and happy to provide you with [facility] exams and how often you need these or other tests. The frequency depends on your symptoms and your personal and family medical history. Blood pressure measurement: all men   Cholesterol test: if you are age 39 or older.  You may start having this test at partner, more than 1 partner, a history of STDs (sexually transmitted diseases), or a partner with an STD   HIV test: The CDC (Centers for Disease Control) recommends a routine HIV test for people age 15 to 72 at their regular checkups.    Tuberculosis (TB) you have already had the shot or the diseases   Hepatitis A shot if you are at risk, for example, through travel or your job, including  service   Hepatitis B shot for all teens and young adults, age 15 to 25 years, who have not had hepatitis or a Sexual behavior: Prevent sexually transmitted infections by avoiding high-risk sexual behavior and by using latex or polyurethane condoms every time you have sexually contact if you are not in a long-term relationship with just one partner who has no other COMP METABOLIC PANEL (14)      Component Value Standard Range & Units    Glucose 102 70-99 mg/dL    BUN 11 8-20 mg/dL    Creatinine 0.80 0.70-1.30 mg/dL    GFR 96 >=60      Estimated GFR units: mL/min/1.73 square meters   eGFR calculated by the CKD-EPI eq Component Value Standard Range & Units    25-Hydroxyvitamin D (Total) 29.4 30.0-100.0 ng/mL      Literature Recommendations for 25(OH)D levels are:  Range           Vitamin D Status   <20 ng/mL         Deficiency   20-30 ng/mL       Insufficiency   30-10 Start activities slowly and build up over time Do what you like   Get your heart pumping – brisk walking, biking, swimming Even 10 minute increments are effective and add up over the week   2 ½ hours per week – spread out over time Use a symone to keep you

## (undated) NOTE — LETTER
01/03/20        [facility] [address] [address]      Dear [facility],    [address] records indicate that you have outstanding lab work and or testing that was ordered for you and has not yet been completed:Please fast for 10 hours prior to la

## (undated) NOTE — LETTER
10/12/20        [address] [id number]      Dear Rae Gonzalez records indicate that you have outstanding lab work and or testing that was ordered for you and has not yet been completed:  Orders Placed This Encounter